# Patient Record
Sex: MALE | Race: OTHER
[De-identification: names, ages, dates, MRNs, and addresses within clinical notes are randomized per-mention and may not be internally consistent; named-entity substitution may affect disease eponyms.]

---

## 2019-02-04 ENCOUNTER — HOSPITAL ENCOUNTER (EMERGENCY)
Dept: HOSPITAL 54 - ER | Age: 29
Discharge: HOME | End: 2019-02-04
Payer: COMMERCIAL

## 2019-02-04 VITALS — WEIGHT: 180 LBS | HEIGHT: 77 IN | BODY MASS INDEX: 21.25 KG/M2

## 2019-02-04 VITALS — SYSTOLIC BLOOD PRESSURE: 154 MMHG | DIASTOLIC BLOOD PRESSURE: 82 MMHG

## 2019-02-04 DIAGNOSIS — Z86.19: ICD-10-CM

## 2019-02-04 DIAGNOSIS — F17.200: ICD-10-CM

## 2019-02-04 DIAGNOSIS — Z60.2: ICD-10-CM
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
## 2019-02-04 DIAGNOSIS — T40.601A: Primary | ICD-10-CM

## 2019-02-04 PROCEDURE — 96374 THER/PROPH/DIAG INJ IV PUSH: CPT

## 2019-02-04 PROCEDURE — 99283 EMERGENCY DEPT VISIT LOW MDM: CPT

## 2019-02-04 SDOH — SOCIAL STABILITY - SOCIAL INSECURITY: PROBLEMS RELATED TO LIVING ALONE: Z60.2

## 2019-02-04 NOTE — NUR
PT BIB RA. COMP OF "WAS GIVEN ALOT OF FENTANYL AND PASSED OUT". 911 WAS CALLED. 
PT AOX4. NARCAN GIVEN ON ROUTE. NO SOB NOTED. -N/V. -DIZZINESS. AWAITING MD ALMENDAREZ.

## 2021-01-12 ENCOUNTER — HOSPITAL ENCOUNTER (EMERGENCY)
Age: 31
Discharge: LEFT BEFORE DISCHARGE | End: 2021-01-12
Attending: EMERGENCY MEDICINE
Payer: COMMERCIAL

## 2021-01-12 VITALS
BODY MASS INDEX: 22.43 KG/M2 | HEIGHT: 77 IN | OXYGEN SATURATION: 100 % | RESPIRATION RATE: 16 BRPM | WEIGHT: 190 LBS | SYSTOLIC BLOOD PRESSURE: 154 MMHG | TEMPERATURE: 98 F | DIASTOLIC BLOOD PRESSURE: 95 MMHG | HEART RATE: 80 BPM

## 2021-01-12 DIAGNOSIS — R55 SYNCOPE AND COLLAPSE: Primary | ICD-10-CM

## 2021-01-12 LAB
EKG ATRIAL RATE: 87 BPM
EKG DIAGNOSIS: NORMAL
EKG P AXIS: 50 DEGREES
EKG P-R INTERVAL: 148 MS
EKG Q-T INTERVAL: 386 MS
EKG QRS DURATION: 100 MS
EKG QTC CALCULATION (BAZETT): 464 MS
EKG R AXIS: 35 DEGREES
EKG T AXIS: 51 DEGREES
EKG VENTRICULAR RATE: 87 BPM
GLUCOSE BLD-MCNC: 157 MG/DL (ref 70–99)
PERFORMED ON: ABNORMAL

## 2021-01-12 PROCEDURE — 93010 ELECTROCARDIOGRAM REPORT: CPT | Performed by: INTERNAL MEDICINE

## 2021-01-12 PROCEDURE — 99284 EMERGENCY DEPT VISIT MOD MDM: CPT

## 2021-01-12 PROCEDURE — 93005 ELECTROCARDIOGRAM TRACING: CPT | Performed by: EMERGENCY MEDICINE

## 2021-01-12 RX ORDER — ACETAMINOPHEN 325 MG/1
650 TABLET ORAL EVERY 6 HOURS PRN
COMMUNITY
End: 2021-05-10

## 2021-01-12 RX ORDER — ONDANSETRON 4 MG/1
4 TABLET, ORALLY DISINTEGRATING ORAL EVERY 8 HOURS PRN
COMMUNITY
End: 2021-04-06

## 2021-01-12 RX ORDER — FERROUS SULFATE 325(65) MG
325 TABLET ORAL
COMMUNITY

## 2021-01-12 RX ORDER — PANTOPRAZOLE SODIUM 40 MG/1
40 TABLET, DELAYED RELEASE ORAL DAILY
COMMUNITY
End: 2021-04-21 | Stop reason: SDUPTHER

## 2021-01-12 RX ORDER — LISINOPRIL 5 MG/1
5 TABLET ORAL DAILY
Status: ON HOLD | COMMUNITY
End: 2021-01-19 | Stop reason: HOSPADM

## 2021-01-12 RX ORDER — ACETAMINOPHEN 160 MG
2000 TABLET,DISINTEGRATING ORAL DAILY
COMMUNITY
End: 2021-05-10

## 2021-01-12 RX ORDER — AMLODIPINE BESYLATE 5 MG/1
5 TABLET ORAL 2 TIMES DAILY
COMMUNITY

## 2021-01-12 RX ORDER — SERTRALINE HYDROCHLORIDE 100 MG/1
100 TABLET, FILM COATED ORAL DAILY
COMMUNITY
End: 2021-10-05

## 2021-01-12 ASSESSMENT — PAIN SCALES - GENERAL: PAINLEVEL_OUTOF10: 7

## 2021-01-12 ASSESSMENT — PAIN DESCRIPTION - PAIN TYPE: TYPE: ACUTE PAIN

## 2021-01-12 ASSESSMENT — PAIN DESCRIPTION - LOCATION: LOCATION: HEAD

## 2021-01-12 NOTE — ED NOTES
Patient standing in doorway asking to be discharged. Encouraged patient to wait for physician prior to leaving.      Caitie Chavez RN  01/12/21 1003

## 2021-01-12 NOTE — ED TRIAGE NOTES
Patient arrived via [facility] for syncopal episode. Was playing video games and passed out. Alert and oriented upon arrival of ems to scene. Pt states he has headache that started after passing out. Recently diagnosed with covid on 12/27.
left toe injury

## 2021-01-12 NOTE — ED NOTES
Patient leaving AMA after speaking with Dr. Heather Monroy. Given AVS and discussed s/s to return to ED for. Alert and oriented at discharge with steady gait.      Neville Flanagan RN  01/12/21 7875

## 2021-01-12 NOTE — ED PROVIDER NOTES
[facility]  EMERGENCY DEPT VISIT      Patient Identification  Demian Coates is a 27 y.o. male. Chief Complaint   Loss of Consciousness and Headache      History of Present Illness: This is a  27 y.o. male who presents via ambulance to the ED with complaints of passing out at home. Patient was recently diagnosed with covid in late December. He just retested negaitive and had been feeling better. He was sitting and playing video games when he became lightehaded and then passed out. No injury or fall, he was on the couch. Denies preceding diaphoresis, chest pain, palpitations, shortness of breath, vomiting. Woke up with girlfriend there who called 911. He now complains only of mild headache. He states his appetite has been decreased during his covid infection and had not been eating well. No vomiting or diarrhea however. Additionally he has note been taking his BP nmeds regularly. He has h/o CKD from polycystic kidney disease. History reviewed. No pertinent past medical history. History reviewed. No pertinent surgical history. No current facility-administered medications for this encounter.      Current Outpatient Medications:     ferrous sulfate (IRON 325) 325 (65 Fe) MG tablet, Take 325 mg by mouth daily (with breakfast), Disp: , Rfl:     pantoprazole (PROTONIX) 40 MG tablet, Take 40 mg by mouth daily, Disp: , Rfl:     Cholecalciferol (VITAMIN D3) 50 MCG (2000 UT) CAPS, Take by mouth, Disp: , Rfl:     sertraline (ZOLOFT) 100 MG tablet, Take 100 mg by mouth daily, Disp: , Rfl:     lisinopril (PRINIVIL;ZESTRIL) 5 MG tablet, Take 5 mg by mouth daily, Disp: , Rfl:     ondansetron (ZOFRAN-ODT) 4 MG disintegrating tablet, Take 4 mg by mouth every 8 hours as needed for Nausea or Vomiting, Disp: , Rfl:     amLODIPine (NORVASC) 5 MG tablet, Take 5 mg by mouth daily, Disp: , Rfl:     acetaminophen (TYLENOL) 325 MG tablet, Take 650 mg by mouth every 6 hours as needed for Pain, Disp: , Rfl:     No Known Allergies    Social History     Socioeconomic History    Marital status: Single     Spouse name: Not on file    Number of children: Not on file    Years of education: Not on file    Highest education level: Not on file   Occupational History    Not on file   Social Needs    Financial resource strain: Not on file    Food insecurity     Worry: Not on file     Inability: Not on file    Transportation needs     Medical: Not on file     Non-medical: Not on file   Tobacco Use    Smoking status: Current Every Day Smoker     Packs/day: 1.00     Types: Cigarettes    Smokeless tobacco: Never Used   Substance and Sexual Activity    Alcohol use: Not Currently    Drug use: Never    Sexual activity: Not on file   Lifestyle    Physical activity     Days per week: Not on file     Minutes per session: Not on file    Stress: Not on file   Relationships    Social connections     Talks on phone: Not on file     Gets together: Not on file     Attends Confucianist service: Not on file     Active member of club or organization: Not on file     Attends meetings of clubs or organizations: Not on file     Relationship status: Not on file    Intimate partner violence     Fear of current or ex partner: Not on file     Emotionally abused: Not on file     Physically abused: Not on file     Forced sexual activity: Not on file   Other Topics Concern    Not on file   Social History Narrative    Not on file       Nursing Notes Reviewed      ROS:  GENERAL:  No fever, no chills, no diaphoresis, + appetite changes  EYES: no eye discharge, no eye redness, no visual changes  ENT: no nasal congestion, no sore throat  CARDIAC: no chest pain, no palpitations, no leg swelling  PULM: no cough, no shortness of breath  ABD: no abdominal pain, no nausea, no vomiting, no diarrhea, no melena or hematochezia  : no dysuria, no hematuria, no urgency, no frequency.  No flank pain  MUSCULOSKELETAL: no back pain, no arthralgias, no myalgias  NEURO: + headache, + lightheadedness, no dizziness, no numbness, no weakness, + syncope, no confusion, no speech difficulty  SKIN: no rashes, no erythema, no wounds, no ecchymosis      PHYSICAL EXAM:  GENERAL APPEARANCE: Mckenna Valencia is in no acute respiratory distress. Awake and alert. VITAL SIGNS:   ED Triage Vitals [01/12/21 1243]   Enc Vitals Group      BP (!) 154/95      Pulse 80      Resp 16      Temp 98 °F (36.7 °C)      Temp Source Oral      SpO2 100 %      Weight 190 lb (86.2 kg)      Height 6' 5\" (1.956 m)      Head Circumference       Peak Flow       Pain Score       Pain Loc       Pain Edu? Excl. in [address]? HEAD: Normocephalic, atraumatic. EYES:  Extraocular muscles are intact. Pupils equal round and reactive to light. Conjunctivas are pink. Negative scleral icterus. ENT:  Mucous membranes are moist.  Pharynx without erythema or exudates. NECK: Nontender and supple. No cervical adenopathy. CHEST:  Clear to auscultation bilaterally. No rales, rhonchi, or wheezing. HEART:  Regular rate and regular rhythm. No murmurs. Strong and equal pulses in the upper and lower extremities. ABDOMEN: Soft,  nondistended, positive bowel sounds. abdomen is nontender. No rebound. no guarding. MUSCULOSKELETAL: The calves are nontender to palpation. Active range of motion of the upper and lower extremities. No edema. NEUROLOGICAL: Awake, alert and oriented x 3. Power intact in the upper and lower extremities. Sensation is intact to light touch in the upper and lower extremities. Cranial Nerves 2-12 are intact. No truncal ataxia. No dysarthria or aphasia. DERMATOLOGIC: No petechiae, rashes, or ecchymoses. No erythema. PSYCH: normal mood and affect. Normal thought content. ED COURSE AND MEDICAL DECISION MAKING:    EKG as interpreted by myself:  normal sinus rhythm with a rate of 87  Axis is   Normal  QTc is  normal  Intervals and Durations are unremarkable. No specific ST-T wave changes appreciated.   No evidence of acute ischemia. Radiology:  Films have been read by radiologist as noted in chart unless otherwise stated. Other radiologic studies (i.e. CT, MRI, ultrasounds, etc ) have been interpreted by radiologist.     No orders to display       Labs:  Results for orders placed or performed during the hospital encounter of 01/12/21   POCT Glucose   Result Value Ref Range    POC Glucose 157 (H) 70 - 99 mg/dl    Performed on ACCU-CHEK    EKG 12 Lead   Result Value Ref Range    Ventricular Rate 87 BPM    Atrial Rate 87 BPM    P-R Interval 148 ms    QRS Duration 100 ms    Q-T Interval 386 ms    QTc Calculation (Bazett) 464 ms    P Axis 50 degrees    R Axis 35 degrees    T Axis 51 degrees    Diagnosis       Normal sinus rhythmNormal ECGConfirmed by CANDICE JESSICA, Jefry Ro ([id number]) on 1/12/2021 1:59:56 PM       Treatment in the department:  Patient received the following while in the ED. No meds      Medical decision making:  Patient presents after syncopal episode without much prodrome and not while standing. Has h/o CKD with last creat over 4 and has recently been sick with covid. I recommended getting labwork and monitoring. At risk for electrolyte disturbance, worsening kidney failure, arrhythmia. Patient declined any further workup or monitoring and wants to leave AMA. Understands risk of recurrent syncope, heart attack, arrhythmia, death, stroke. Clinical Impression:  1. Syncope and collapse        Dispo:  Patient will be leaving AMA at this time    I discussed the nature and purpose, risks and benefits, as well as, the alternatives of staying for labwork and monitoring vs discharge without workup with Deejay Denisetania. Deejay Elio was given the time and opportunity to ask questions and consider their options, and after the discussion, Deejay Howietania decided to refuse. I informed Deejay Ballard that refusal could lead to, but was not limited to, death, permanent disability, or severe pain.  If present, I asked the relatives or significant others of Lara Barry to dissuade them without success. Prior to refusing, their nurse and I determined and agreed that Lara Barry had the capacity to make this decision and understood the consequences of that decision. Lara Barry signed the refusal of treatment form and their nurse signed the form agreeing that the patient/guardian had received informed consent. After refusal, I made every reasonable opportunity to treat Lara Barry to the best of my ability. Discharge vitals:  Blood pressure (!) 154/95, pulse 80, temperature 98 °F (36.7 °C), temperature source Oral, resp. rate 16, height 6' 5\" (1.956 m), weight 190 lb (86.2 kg), SpO2 100 %. Prescriptions given:   Discharge Medication List as of 1/12/2021  2:19 PM          This chart was created using Dragon voice recognition software.         Emerita Helm MD  01/12/21 9549

## 2021-01-18 ENCOUNTER — APPOINTMENT (OUTPATIENT)
Dept: CT IMAGING | Age: 31
DRG: 312 | End: 2021-01-18
Payer: COMMERCIAL

## 2021-01-18 ENCOUNTER — HOSPITAL ENCOUNTER (INPATIENT)
Age: 31
LOS: 1 days | Discharge: HOME | DRG: 312 | End: 2021-01-19
Attending: EMERGENCY MEDICINE | Admitting: INTERNAL MEDICINE
Payer: COMMERCIAL

## 2021-01-18 ENCOUNTER — APPOINTMENT (OUTPATIENT)
Dept: GENERAL RADIOLOGY | Age: 31
DRG: 312 | End: 2021-01-18
Payer: COMMERCIAL

## 2021-01-18 DIAGNOSIS — R31.9 URINARY TRACT INFECTION WITH HEMATURIA, SITE UNSPECIFIED: ICD-10-CM

## 2021-01-18 DIAGNOSIS — D63.8 ANEMIA OF CHRONIC DISEASE: ICD-10-CM

## 2021-01-18 DIAGNOSIS — R55 SYNCOPE AND COLLAPSE: Primary | ICD-10-CM

## 2021-01-18 DIAGNOSIS — N17.9 ACUTE KIDNEY INJURY SUPERIMPOSED ON CHRONIC KIDNEY DISEASE (HCC): ICD-10-CM

## 2021-01-18 DIAGNOSIS — N18.9 ACUTE KIDNEY INJURY SUPERIMPOSED ON CHRONIC KIDNEY DISEASE (HCC): ICD-10-CM

## 2021-01-18 DIAGNOSIS — N39.0 URINARY TRACT INFECTION WITH HEMATURIA, SITE UNSPECIFIED: ICD-10-CM

## 2021-01-18 PROBLEM — Q61.3 POLYCYSTIC KIDNEY: Status: ACTIVE | Noted: 2021-01-18

## 2021-01-18 LAB
A/G RATIO: 1.4 (ref 1.1–2.2)
ALBUMIN SERPL-MCNC: 4.2 G/DL (ref 3.4–5)
ALP BLD-CCNC: 58 U/L (ref 40–129)
ALT SERPL-CCNC: 19 U/L (ref 10–40)
AMPHETAMINE SCREEN, URINE: NORMAL
ANION GAP SERPL CALCULATED.3IONS-SCNC: 12 MMOL/L (ref 3–16)
AST SERPL-CCNC: 15 U/L (ref 15–37)
BACTERIA: ABNORMAL /HPF
BARBITURATE SCREEN URINE: NORMAL
BASOPHILS ABSOLUTE: 0.4 K/UL (ref 0–0.2)
BASOPHILS RELATIVE PERCENT: 5.1 %
BENZODIAZEPINE SCREEN, URINE: NORMAL
BILIRUB SERPL-MCNC: <0.2 MG/DL (ref 0–1)
BILIRUBIN URINE: NEGATIVE
BLOOD, URINE: ABNORMAL
BUN BLDV-MCNC: 51 MG/DL (ref 7–20)
C. TRACHOMATIS DNA ,URINE: NEGATIVE
CALCIUM SERPL-MCNC: 8.6 MG/DL (ref 8.3–10.6)
CANNABINOID SCREEN URINE: NORMAL
CHLORIDE BLD-SCNC: 106 MMOL/L (ref 99–110)
CLARITY: ABNORMAL
CO2: 21 MMOL/L (ref 21–32)
COCAINE METABOLITE SCREEN URINE: NORMAL
COLOR: YELLOW
CREAT SERPL-MCNC: 5 MG/DL (ref 0.9–1.3)
EKG ATRIAL RATE: 86 BPM
EKG DIAGNOSIS: NORMAL
EKG P AXIS: 59 DEGREES
EKG P-R INTERVAL: 152 MS
EKG Q-T INTERVAL: 398 MS
EKG QRS DURATION: 100 MS
EKG QTC CALCULATION (BAZETT): 476 MS
EKG R AXIS: 48 DEGREES
EKG T AXIS: 54 DEGREES
EKG VENTRICULAR RATE: 86 BPM
EOSINOPHILS ABSOLUTE: 0.1 K/UL (ref 0–0.6)
EOSINOPHILS RELATIVE PERCENT: 1.1 %
EPITHELIAL CELLS, UA: ABNORMAL /HPF (ref 0–5)
GFR AFRICAN AMERICAN: 17
GFR NON-AFRICAN AMERICAN: 14
GLOBULIN: 3 G/DL
GLUCOSE BLD-MCNC: 195 MG/DL (ref 70–99)
GLUCOSE URINE: NEGATIVE MG/DL
HCT VFR BLD CALC: 29.7 % (ref 40.5–52.5)
HCT VFR BLD CALC: 30 % (ref 40.5–52.5)
HEMOGLOBIN: 9.9 G/DL (ref 13.5–17.5)
IMMATURE RETIC FRACT: 0.31 (ref 0.21–0.37)
KETONES, URINE: NEGATIVE MG/DL
LEUKOCYTE ESTERASE, URINE: ABNORMAL
LV EF: 58 %
LVEF MODALITY: NORMAL
LYMPHOCYTES ABSOLUTE: 0.8 K/UL (ref 1–5.1)
LYMPHOCYTES RELATIVE PERCENT: 9.5 %
Lab: NORMAL
MAGNESIUM: 2.2 MG/DL (ref 1.8–2.4)
MCH RBC QN AUTO: 30.7 PG (ref 26–34)
MCHC RBC AUTO-ENTMCNC: 33.2 G/DL (ref 31–36)
MCV RBC AUTO: 92.4 FL (ref 80–100)
METHADONE SCREEN, URINE: NORMAL
MICROSCOPIC EXAMINATION: YES
MONOCYTES ABSOLUTE: 0.5 K/UL (ref 0–1.3)
MONOCYTES RELATIVE PERCENT: 5.6 %
N. GONORRHOEAE DNA, URINE: NEGATIVE
NEUTROPHILS ABSOLUTE: 6.6 K/UL (ref 1.7–7.7)
NEUTROPHILS RELATIVE PERCENT: 78.7 %
NITRITE, URINE: NEGATIVE
OPIATE SCREEN URINE: NORMAL
OXYCODONE URINE: NORMAL
PDW BLD-RTO: 13.4 % (ref 12.4–15.4)
PH UA: 6
PH UA: 6 (ref 5–8)
PHENCYCLIDINE SCREEN URINE: NORMAL
PLATELET # BLD: 132 K/UL (ref 135–450)
PMV BLD AUTO: 10.2 FL (ref 5–10.5)
POTASSIUM SERPL-SCNC: 4.4 MMOL/L (ref 3.5–5.1)
PROPOXYPHENE SCREEN: NORMAL
PROTEIN UA: 100 MG/DL
RBC # BLD: 3.22 M/UL (ref 4.2–5.9)
RBC UA: ABNORMAL /HPF (ref 0–4)
RETICULOCYTE ABSOLUTE COUNT: 0.02 M/UL
RETICULOCYTE COUNT PCT: 0.49 % (ref 0.5–2.18)
SODIUM BLD-SCNC: 139 MMOL/L (ref 136–145)
SPECIFIC GRAVITY UA: 1.02 (ref 1–1.03)
TOTAL PROTEIN: 7.2 G/DL (ref 6.4–8.2)
URINE TYPE: ABNORMAL
UROBILINOGEN, URINE: 0.2 E.U./DL
WBC # BLD: 8.4 K/UL (ref 4–11)
WBC UA: ABNORMAL /HPF (ref 0–5)

## 2021-01-18 PROCEDURE — 1200000000 HC SEMI PRIVATE

## 2021-01-18 PROCEDURE — 87086 URINE CULTURE/COLONY COUNT: CPT

## 2021-01-18 PROCEDURE — 6360000002 HC RX W HCPCS: Performed by: EMERGENCY MEDICINE

## 2021-01-18 PROCEDURE — 85045 AUTOMATED RETICULOCYTE COUNT: CPT

## 2021-01-18 PROCEDURE — 2580000003 HC RX 258: Performed by: INTERNAL MEDICINE

## 2021-01-18 PROCEDURE — 80053 COMPREHEN METABOLIC PANEL: CPT

## 2021-01-18 PROCEDURE — 81001 URINALYSIS AUTO W/SCOPE: CPT

## 2021-01-18 PROCEDURE — 87591 N.GONORRHOEAE DNA AMP PROB: CPT

## 2021-01-18 PROCEDURE — 71046 X-RAY EXAM CHEST 2 VIEWS: CPT

## 2021-01-18 PROCEDURE — G0378 HOSPITAL OBSERVATION PER HR: HCPCS

## 2021-01-18 PROCEDURE — 36415 COLL VENOUS BLD VENIPUNCTURE: CPT

## 2021-01-18 PROCEDURE — 2580000003 HC RX 258: Performed by: EMERGENCY MEDICINE

## 2021-01-18 PROCEDURE — 96361 HYDRATE IV INFUSION ADD-ON: CPT

## 2021-01-18 PROCEDURE — 85025 COMPLETE CBC W/AUTO DIFF WBC: CPT

## 2021-01-18 PROCEDURE — 99285 EMERGENCY DEPT VISIT HI MDM: CPT

## 2021-01-18 PROCEDURE — 87491 CHLMYD TRACH DNA AMP PROBE: CPT

## 2021-01-18 PROCEDURE — 6370000000 HC RX 637 (ALT 250 FOR IP): Performed by: INTERNAL MEDICINE

## 2021-01-18 PROCEDURE — 87390 HIV-1 AG IA: CPT

## 2021-01-18 PROCEDURE — 96365 THER/PROPH/DIAG IV INF INIT: CPT

## 2021-01-18 PROCEDURE — 93010 ELECTROCARDIOGRAM REPORT: CPT | Performed by: INTERNAL MEDICINE

## 2021-01-18 PROCEDURE — 80307 DRUG TEST PRSMV CHEM ANLYZR: CPT

## 2021-01-18 PROCEDURE — 93306 TTE W/DOPPLER COMPLETE: CPT

## 2021-01-18 PROCEDURE — 93005 ELECTROCARDIOGRAM TRACING: CPT | Performed by: EMERGENCY MEDICINE

## 2021-01-18 PROCEDURE — 83735 ASSAY OF MAGNESIUM: CPT

## 2021-01-18 PROCEDURE — 86701 HIV-1ANTIBODY: CPT

## 2021-01-18 PROCEDURE — 86702 HIV-2 ANTIBODY: CPT

## 2021-01-18 PROCEDURE — 6360000002 HC RX W HCPCS: Performed by: INTERNAL MEDICINE

## 2021-01-18 PROCEDURE — 96372 THER/PROPH/DIAG INJ SC/IM: CPT

## 2021-01-18 PROCEDURE — 74176 CT ABD & PELVIS W/O CONTRAST: CPT

## 2021-01-18 RX ORDER — SODIUM CHLORIDE 0.9 % (FLUSH) 0.9 %
10 SYRINGE (ML) INJECTION EVERY 12 HOURS SCHEDULED
Status: DISCONTINUED | OUTPATIENT
Start: 2021-01-18 | End: 2021-01-19 | Stop reason: HOSPADM

## 2021-01-18 RX ORDER — PANTOPRAZOLE SODIUM 40 MG/1
40 TABLET, DELAYED RELEASE ORAL DAILY
Status: DISCONTINUED | OUTPATIENT
Start: 2021-01-18 | End: 2021-01-19 | Stop reason: HOSPADM

## 2021-01-18 RX ORDER — ONDANSETRON 4 MG/1
4 TABLET, ORALLY DISINTEGRATING ORAL EVERY 8 HOURS PRN
Status: DISCONTINUED | OUTPATIENT
Start: 2021-01-18 | End: 2021-01-19 | Stop reason: HOSPADM

## 2021-01-18 RX ORDER — AMLODIPINE BESYLATE 5 MG/1
5 TABLET ORAL 2 TIMES DAILY
Status: DISCONTINUED | OUTPATIENT
Start: 2021-01-18 | End: 2021-01-19 | Stop reason: HOSPADM

## 2021-01-18 RX ORDER — AZITHROMYCIN 250 MG/1
1000 TABLET, FILM COATED ORAL ONCE
Status: COMPLETED | OUTPATIENT
Start: 2021-01-18 | End: 2021-01-18

## 2021-01-18 RX ORDER — ACETAMINOPHEN 325 MG/1
650 TABLET ORAL EVERY 6 HOURS PRN
Status: DISCONTINUED | OUTPATIENT
Start: 2021-01-18 | End: 2021-01-19 | Stop reason: HOSPADM

## 2021-01-18 RX ORDER — 0.9 % SODIUM CHLORIDE 0.9 %
500 INTRAVENOUS SOLUTION INTRAVENOUS ONCE
Status: COMPLETED | OUTPATIENT
Start: 2021-01-18 | End: 2021-01-18

## 2021-01-18 RX ORDER — SODIUM CHLORIDE 0.9 % (FLUSH) 0.9 %
10 SYRINGE (ML) INJECTION PRN
Status: DISCONTINUED | OUTPATIENT
Start: 2021-01-18 | End: 2021-01-19 | Stop reason: HOSPADM

## 2021-01-18 RX ORDER — ONDANSETRON 2 MG/ML
4 INJECTION INTRAMUSCULAR; INTRAVENOUS EVERY 6 HOURS PRN
Status: DISCONTINUED | OUTPATIENT
Start: 2021-01-18 | End: 2021-01-19 | Stop reason: HOSPADM

## 2021-01-18 RX ORDER — POLYETHYLENE GLYCOL 3350 17 G/17G
17 POWDER, FOR SOLUTION ORAL DAILY PRN
Status: DISCONTINUED | OUTPATIENT
Start: 2021-01-18 | End: 2021-01-19 | Stop reason: HOSPADM

## 2021-01-18 RX ORDER — FERROUS SULFATE 325(65) MG
325 TABLET ORAL
Status: DISCONTINUED | OUTPATIENT
Start: 2021-01-19 | End: 2021-01-19 | Stop reason: HOSPADM

## 2021-01-18 RX ORDER — ACETAMINOPHEN 325 MG/1
650 TABLET ORAL EVERY 6 HOURS PRN
Status: DISCONTINUED | OUTPATIENT
Start: 2021-01-18 | End: 2021-01-18 | Stop reason: SDUPTHER

## 2021-01-18 RX ORDER — SODIUM CHLORIDE 9 MG/ML
INJECTION, SOLUTION INTRAVENOUS CONTINUOUS
Status: DISCONTINUED | OUTPATIENT
Start: 2021-01-18 | End: 2021-01-19 | Stop reason: HOSPADM

## 2021-01-18 RX ORDER — ACETAMINOPHEN 650 MG/1
650 SUPPOSITORY RECTAL EVERY 6 HOURS PRN
Status: DISCONTINUED | OUTPATIENT
Start: 2021-01-18 | End: 2021-01-19 | Stop reason: HOSPADM

## 2021-01-18 RX ORDER — PROMETHAZINE HYDROCHLORIDE 25 MG/1
12.5 TABLET ORAL EVERY 6 HOURS PRN
Status: DISCONTINUED | OUTPATIENT
Start: 2021-01-18 | End: 2021-01-19 | Stop reason: HOSPADM

## 2021-01-18 RX ORDER — SERTRALINE HYDROCHLORIDE 100 MG/1
100 TABLET, FILM COATED ORAL DAILY
Status: DISCONTINUED | OUTPATIENT
Start: 2021-01-18 | End: 2021-01-19 | Stop reason: HOSPADM

## 2021-01-18 RX ORDER — HEPARIN SODIUM 5000 [USP'U]/ML
5000 INJECTION, SOLUTION INTRAVENOUS; SUBCUTANEOUS EVERY 8 HOURS SCHEDULED
Status: DISCONTINUED | OUTPATIENT
Start: 2021-01-18 | End: 2021-01-19 | Stop reason: HOSPADM

## 2021-01-18 RX ADMIN — HEPARIN SODIUM 5000 UNITS: 5000 INJECTION INTRAVENOUS; SUBCUTANEOUS at 13:45

## 2021-01-18 RX ADMIN — Medication 10 ML: at 11:24

## 2021-01-18 RX ADMIN — PANTOPRAZOLE SODIUM 40 MG: 40 TABLET, DELAYED RELEASE ORAL at 11:24

## 2021-01-18 RX ADMIN — HEPARIN SODIUM 5000 UNITS: 5000 INJECTION INTRAVENOUS; SUBCUTANEOUS at 20:50

## 2021-01-18 RX ADMIN — SODIUM CHLORIDE: 9 INJECTION, SOLUTION INTRAVENOUS at 10:48

## 2021-01-18 RX ADMIN — AMLODIPINE BESYLATE 5 MG: 5 TABLET ORAL at 20:50

## 2021-01-18 RX ADMIN — DEXTROSE MONOHYDRATE 1 G: 5 INJECTION INTRAVENOUS at 05:10

## 2021-01-18 RX ADMIN — AMLODIPINE BESYLATE 5 MG: 5 TABLET ORAL at 13:44

## 2021-01-18 RX ADMIN — SODIUM CHLORIDE 500 ML: 900 INJECTION, SOLUTION INTRAVENOUS at 04:10

## 2021-01-18 RX ADMIN — AZITHROMYCIN MONOHYDRATE 1000 MG: 250 TABLET ORAL at 11:24

## 2021-01-18 RX ADMIN — SERTRALINE HYDROCHLORIDE 100 MG: 100 TABLET ORAL at 11:24

## 2021-01-18 ASSESSMENT — ENCOUNTER SYMPTOMS
ANAL BLEEDING: 1
CHOKING: 0
EYE ITCHING: 0
NAUSEA: 0
STRIDOR: 0
PHOTOPHOBIA: 0
DIARRHEA: 0
SINUS PRESSURE: 0
VOMITING: 0
EYE DISCHARGE: 0
SHORTNESS OF BREATH: 1
EYE PAIN: 0
RHINORRHEA: 0
APNEA: 0
ABDOMINAL PAIN: 0
ABDOMINAL DISTENTION: 0
CHEST TIGHTNESS: 0
BLOOD IN STOOL: 1
SORE THROAT: 0
COUGH: 1
TROUBLE SWALLOWING: 0
CONSTIPATION: 1

## 2021-01-18 ASSESSMENT — PAIN SCALES - GENERAL
PAINLEVEL_OUTOF10: 0
PAINLEVEL_OUTOF10: 0

## 2021-01-18 NOTE — PLAN OF CARE
Problem: Falls - Risk of:  Goal: Will remain free from falls  Description: Will remain free from falls  Outcome: Met This Shift  Note: Bed in low position, wheels locked, call light in reach. Bed alarm on. Reviewed safety, patient agreeable to call prior to ambulation. Safety maintained. Problem: Fluid Volume:  Goal: Will show no signs or symptoms of fluid imbalance  Description: Will show no signs or symptoms of fluid imbalance  Outcome: Ongoing  Note: Patient vitals stable. Urinary output > 30 ml/hr. NS infusing at 100 ml/hr RAC, maintained. Tolerating renal diet. Will monitor. Problem: Nutritional:  Goal: Maintenance of adequate nutrition will be supported  Description: Maintenance of adequate nutrition will be supported  Outcome: Ongoing  Note: Patient eating  % of meals. Denies nausea. Will monitor. Problem: Urinary Elimination:  Goal: Ability to achieve and maintain adequate urine output will be supported  Description: Ability to achieve and maintain adequate urine output will be supported  Outcome: Met This Shift  Note: Patient with 700 ml of urine out this shift. Clear yellow. Will monitor.

## 2021-01-18 NOTE — PROGRESS NOTES
Patient alert and oriented. Vitals stable, denies pain or nausea. Assessment wnl. Patient denies dizziness. IV RAC, started NS infusion at 100 ml/hr. Oral antibiotic given. Patient left floor for echo, returned. NSR on Tele-monitor. Tolerating renal diet. Reviewed safety and plan of care. Bed alarm on, urinal for elimination, call light in reach. Will monitor.

## 2021-01-18 NOTE — LETTER
[facility] [address] [facility]  [address] [place] [id number]  Phone: [phone]             January 19, 2021    Patient: Syeda Moser   YOB: 1990   Date of Visit: 1/18/2021       To Whom It May Concern:    Syeda Moser was seen and treated in our facility  beginning 1/18/2021 until  1/19/2021. He may return to work on 1/20/2021.       Sincerely,       Russ Ramirez MD         Signature:__________________________________

## 2021-01-18 NOTE — ED NOTES
Pt informed that eta for transport was around 8am and pt states that he had a panic attack about 20 minutes prior to the syncopal episode and thinks he forgot to tell the MD that information upon his arrival to ER. Pt urinated about 400 ml in urinal and when asked about dialysis pt states that he is not on it yet but it has been discussed and his kidney doctor is through [facility]. Pt states that he is still having issues with his breathing since having covid back in December.      Hamilton Cuevas RN  01/18/21 8113

## 2021-01-18 NOTE — ED PROVIDER NOTES
[facility]  EMERGENCY DEPT VISIT      Patient Identification  Jamie Velarde is a 27 y.o. male. Chief Complaint   Loss of Consciousness (States he was watching a movie and when he got up he had LOC for a couple of minutes per Squad. Denies pain. Squad also states patient has been recently told he is in kidney failure. Squad states patient was Alert and Oriented when they got there. )      History of Present Illness: This is a  27 y.o. male who presents via ambulance to the ED with complaints of a syncopal episode. Patient seen in ED for syncope 1/12/21 but left AMA prior to having any labwork completed. Today he was watching a movie, stood up and passed out. No injury from fall. Reports feeling lightheaded. No headache. No chest pain. No palpitations. No shortness of breath. Recent COVID late December. Still has slight cough. No fever. No vomiting or diarrhea. No abdominal pain. Has h/o PCKD and CRF. Has not been taking his meds regularly. Past Medical History:   Diagnosis Date    Chronic kidney disease     Hematuria     Hepatitis C     Hypertension     Kidney failure     Overdose     Polycystic kidney        History reviewed. No pertinent surgical history. No current facility-administered medications for this encounter.      Current Outpatient Medications:     ferrous sulfate (IRON 325) 325 (65 Fe) MG tablet, Take 325 mg by mouth daily (with breakfast), Disp: , Rfl:     pantoprazole (PROTONIX) 40 MG tablet, Take 40 mg by mouth daily, Disp: , Rfl:     Cholecalciferol (VITAMIN D3) 50 MCG (2000 UT) CAPS, Take by mouth, Disp: , Rfl:     sertraline (ZOLOFT) 100 MG tablet, Take 100 mg by mouth daily, Disp: , Rfl:     lisinopril (PRINIVIL;ZESTRIL) 5 MG tablet, Take 5 mg by mouth daily, Disp: , Rfl:     ondansetron (ZOFRAN-ODT) 4 MG disintegrating tablet, Take 4 mg by mouth every 8 hours as needed for Nausea or Vomiting, Disp: , Rfl: PULM: + cough, no shortness of breath  ABD: no abdominal pain, no nausea, no vomiting, no diarrhea, no melena or hematochezia  : no dysuria, no hematuria, no urgency, no frequency. No flank pain  MUSCULOSKELETAL: no back pain, no arthralgias, no myalgias  NEURO: no headache, + lightheadedness, no dizziness, no numbness, no weakness, + syncope, no confusion, no speech difficulty  SKIN: no rashes, no erythema, no wounds, no ecchymosis      PHYSICAL EXAM:  GENERAL APPEARANCE: Wyatt Leos is in no acute respiratory distress. Awake and alert. VITAL SIGNS:   ED Triage Vitals [01/18/21 0354]   Enc Vitals Group      BP (!) 174/88      Pulse 88      Resp 18      Temp 98 °F (36.7 °C)      Temp Source Oral      SpO2 100 %      Weight 190 lb (86.2 kg)      Height       Head Circumference       Peak Flow       Pain Score       Pain Loc       Pain Edu? Excl. in [address]? HEAD: Normocephalic, atraumatic. EYES:  Extraocular muscles are intact. Pupils equal round and reactive to light. Conjunctivas are pink. Negative scleral icterus. ENT:  Mucous membranes are moist.  Pharynx without erythema or exudates. NECK: Nontender and supple. No cervical adenopathy. CHEST:  Clear to auscultation bilaterally. No rales, rhonchi, or wheezing. HEART:  Regular rate and regular rhythm. No murmurs. Strong and equal pulses in the upper and lower extremities. ABDOMEN: Soft,  nondistended, positive bowel sounds. abdomen is nontender. No rebound. no guarding. MUSCULOSKELETAL: The calves are nontender to palpation. Active range of motion of the upper and lower extremities. No edema. NEUROLOGICAL: Awake, alert and oriented x 3. Power intact in the upper and lower extremities. Sensation is intact to light touch in the upper and lower extremities. Cranial Nerves 2-12 are intact. No truncal ataxia. No dysarthria or aphasia. DERMATOLOGIC: No petechiae, rashes, or ecchymoses. No erythema. PSYCH: normal mood and affect. Normal thought content. ED COURSE AND MEDICAL DECISION MAKING:    EKG as interpreted by myself:  normal sinus rhythm with a rate of 86  Axis is   Normal  QTc is  476ms  Intervals and Durations are unremarkable. No specific ST-T wave changes appreciated. No evidence of acute ischemia. Compared to prior EKG dated 1/12/21, no significant change    Radiology:  Films have been read by radiologist as noted in chart unless otherwise stated. Other radiologic studies (i.e. CT, MRI, ultrasounds, etc ) have been interpreted by radiologist.     [place]   Final Result        Large bolus right apical pneumothorax and mild centrilobular/paraseptal    emphysema. No pneumothorax.   _______________________________________________       IMPRESSION:             1.  Consistent with adult polycystic kidney disease. Few Punctate    nonobstructive stones versus calcified cysts. Kidneys are significantly    enlarged. XR CHEST (2 VW)   Final Result        Large bullous emphysema in the right upper lobe measuring approximately    15 cm. Less likely pneumothorax. Recommend CT of the chest for better    characterization. Xr Chest (2 Vw)    Result Date: 1/18/2021  Patient: Yoselin Nguyen  Time Out: 05:23 Exam(s): FILM CXR 2 VIEWS  EXAM:   XR Chest, 2 Views  CLINICAL HISTORY:   cough, fainting. TECHNIQUE:   Frontal and lateral views of the chest.  COMPARISON:   No relevant prior studies available. FINDINGS:   Lungs:  No consolidation or mass. Large bullous air-filled structure in the right upper lobe. Pleural space:  No effusion. Heart:  No cardiomegaly. Bones/joints:  No acute findings. Electronically signed by Ministerio Nelson MD on 01-18-21 at 1790      Large bullous emphysema in the right upper lobe measuring approximately 15 cm. Less likely pneumothorax. Recommend CT of the chest for better characterization. Ct Chest Abdomen Pelvis Wo Contrast    Result Date: 1/18/2021 Patient: Bethany Vasquez  Time Out: 06:32 Exam(s): CT CHEST Without Contrast, CT ABDOMEN + PELVIS Without Contrast  EXAM:   CT Chest Without Intravenous Contrast  CLINICAL HISTORY:   large bulla vs pneumo, JALEN with PCKD, recurrent syncope. TECHNIQUE:   Axial computed tomography images of the chest without intravenous contrast.  CTDI is 9 mGy and DLP is 746 mGy-cm. All CT scans at this facility use dose modulation, iterative reconstruction, and/or weight based dosing when appropriate to reduce radiation dose to as low as reasonably achievable. COMPARISON:   Chest x-ray same date  FINDINGS:   Lungs:  No mass. No consolidation. Large bullous emphysema in the right upper lobe. Mild paraseptal and centrilobular emphysema. Pleural space:  No pneumothorax. No effusion. Heart:  Normal heart size. No pericardial effusion. Bones/joints:  No acute fracture. Soft tissues:  Unremarkable. Vasculature:  Unremarkable. No thoracic aortic aneurysm. Lymph nodes:  No enlarged lymph nodes. EXAM:   CT Abdomen and Pelvis Without Intravenous Contrast  CLINICAL HISTORY:   large bulla vs pneumo, JALEN with PCKD, recurrent syncope. TECHNIQUE:   Axial computed tomography images of the abdomen and pelvis without intravenous contrast.  CTDI is 9 mGy and DLP is 746 mGy-cm. All CT scans at this facility use dose modulation, iterative reconstruction, and/or weight based dosing when appropriate to reduce radiation dose to as low as reasonably achievable. COMPARISON:   No relevant prior studies available. FINDINGS:     ABDOMEN:   Liver:  Unremarkable. Gallbladder and bile ducts:  Unremarkable. Pancreas:  No ductal dilation. Spleen:  Unremarkable. Adrenals:  Unremarkable. Kidneys and ureters:  No obstructing stones. No hydronephrosis. Enlarged multicystic kidneys. Few Punctate nonobstructive stones versus calcified cysts. Stomach and bowel:  No bowel obstruction. No bowel wall thickening.    PELVIS:   Appendix: No evidence of appendicitis. Bladder:  No stones. Reproductive:  Unremarkable. ABDOMEN and PELVIS:   Intraperitoneal space:  Unremarkable. Bones/joints:  No acute fractures. Soft tissues:  Unremarkable. Vasculature:  No abdominal aortic aneurysm. Lymph nodes:  No enlarged lymph nodes. Electronically signed by Maru Fall MD on 01-18-21 at 9038      Large bolus right apical pneumothorax and mild centrilobular/paraseptal emphysema. No pneumothorax. _______________________________________________  IMPRESSION:       1. Consistent with adult polycystic kidney disease. Few Punctate nonobstructive stones versus calcified cysts. Kidneys are significantly enlarged.     Labs:  Results for orders placed or performed during the hospital encounter of 01/18/21   CBC Auto Differential   Result Value Ref Range    WBC 8.4 4.0 - 11.0 K/uL    RBC 3.22 (L) 4.20 - 5.90 M/uL    Hemoglobin 9.9 (L) 13.5 - 17.5 g/dL    Hematocrit 29.7 (L) 40.5 - 52.5 %    MCV 92.4 80.0 - 100.0 fL    MCH 30.7 26.0 - 34.0 pg    MCHC 33.2 31.0 - 36.0 g/dL    RDW 13.4 12.4 - 15.4 %    Platelets 281 (L) 719 - 450 K/uL    MPV 10.2 5.0 - 10.5 fL    Neutrophils % 78.7 %    Lymphocytes % 9.5 %    Monocytes % 5.6 %    Eosinophils % 1.1 %    Basophils % 5.1 %    Neutrophils Absolute 6.6 1.7 - 7.7 K/uL    Lymphocytes Absolute 0.8 (L) 1.0 - 5.1 K/uL    Monocytes Absolute 0.5 0.0 - 1.3 K/uL    Eosinophils Absolute 0.1 0.0 - 0.6 K/uL    Basophils Absolute 0.4 (H) 0.0 - 0.2 K/uL   Comprehensive Metabolic Panel   Result Value Ref Range    Sodium 139 136 - 145 mmol/L    Potassium 4.4 3.5 - 5.1 mmol/L    Chloride 106 99 - 110 mmol/L    CO2 21 21 - 32 mmol/L    Anion Gap 12 3 - 16    Glucose 195 (H) 70 - 99 mg/dL    BUN 51 (H) 7 - 20 mg/dL    CREATININE 5.0 (H) 0.9 - 1.3 mg/dL    GFR Non-African American 14 (A) >60    GFR  17 (A) >60    Calcium 8.6 8.3 - 10.6 mg/dL    Total Protein 7.2 6.4 - 8.2 g/dL    Alb 4.2 3.4 - 5.0 g/dL Albumin/Globulin Ratio 1.4 1.1 - 2.2    Total Bilirubin <0.2 0.0 - 1.0 mg/dL    Alkaline Phosphatase 58 40 - 129 U/L    ALT 19 10 - 40 U/L    AST 15 15 - 37 U/L    Globulin 3.0 g/dL   Magnesium   Result Value Ref Range    Magnesium 2.20 1.80 - 2.40 mg/dL   Urinalysis   Result Value Ref Range    Color, UA Yellow Straw/Yellow    Clarity, UA SL CLOUDY (A) Clear    Glucose, Ur Negative Negative mg/dL    Bilirubin Urine Negative Negative    Ketones, Urine Negative Negative mg/dL    Specific Gravity, UA 1.025 1.005 - 1.030    Blood, Urine LARGE (A) Negative    pH, UA 6.0 5.0 - 8.0    Protein,  (A) Negative mg/dL    Urobilinogen, Urine 0.2 <2.0 E.U./dL    Nitrite, Urine Negative Negative    Leukocyte Esterase, Urine TRACE (A) Negative    Microscopic Examination YES     Urine Type NotGiven    Urine Drug Screen   Result Value Ref Range    Amphetamine Screen, Urine Neg Negative <1000ng/mL    Barbiturate Screen, Ur Neg Negative <200 ng/mL    Benzodiazepine Screen, Urine Neg Negative <200 ng/mL    Cannabinoid Scrn, Ur Neg Negative <50 ng/mL    Cocaine Metabolite Screen, Urine Neg Negative <300 ng/mL    Opiate Scrn, Ur Neg Negative <300 ng/mL    PCP Screen, Urine Neg Negative <25 ng/mL    Methadone Screen, Urine Neg Negative <300 ng/mL    Propoxyphene Scrn, Ur Neg Negative <300 ng/mL    Oxycodone Urine Neg Negative <100 ng/ml    pH, UA 6.0     Drug Screen Comment: see below    Microscopic Urinalysis   Result Value Ref Range    WBC, UA 21-50 (A) 0 - 5 /HPF    RBC, UA  (A) 0 - 4 /HPF    Epithelial Cells, UA 0-1 0 - 5 /HPF    Bacteria, UA Rare (A) None Seen /HPF   EKG 12 Lead   Result Value Ref Range    Ventricular Rate 86 BPM    Atrial Rate 86 BPM    P-R Interval 152 ms    QRS Duration 100 ms    Q-T Interval 398 ms    QTc Calculation (Bazett) 476 ms    P Axis 59 degrees    R Axis 48 degrees    T Axis 54 degrees    Diagnosis Poor data quality, interpretation may be adversely affectedNormal sinus rhythmNormal ECG       Treatment in the department:  Patient received the following while in the ED. Medications   0.9 % sodium chloride bolus (0 mLs Intravenous Stopped 1/18/21 0511)   cefTRIAXone (ROCEPHIN) 1 g IVPB in 50 mL D5W minibag (0 g Intravenous Stopped 1/18/21 0538)     REVIEW OF PAST RECORDS SHOW LAST CREAT 4.0 IN 9/2020 AND HGB WAS 11.4 AT THAT TIME    HE WAS NOT ORTHOSTATIC  OCCASIONAL PVCS NOTED ON CARDIAC MONITOR    Medical decision making:  Patient presents with second syncopal episode in one week. No significant prodrome either time. Only occasional PVCs on monitor, no arrhythmias noted. Has worsening renal failure but no hyperkalemia or acidosis. Anemia worsening, likely aneia of chronic kidney disease. No neuro symptoms or deficits and no headache so not suspicious of CVA or SAH or ICH. No seizure activity noted with syncopal episodes. Has UTI but no fever or leukocytosis or hypotension to suggest sepsis. I spoke with Dr. Yfn Garcia. We thoroughly discussed the history, physical exam, laboratory and imaging studies, as well as, emergency department course. Based upon that discussion, we've decided to admit Clayton Hilario for further observation and evaluation of Estephania Brar's syncope and JALEN with CKD and PCKD. As I have deemed necessary from their history, physical, and studies, I have considered and evaluated Clayton Hilario for the following diagnoses: dehydration, JALEN, electrolyte disturbance, anemia, arrhythmia, orthostasis, ACS, PE, ICH, seizure, sepsis          Clinical Impression:  1. Syncope and collapse    2. Acute kidney injury superimposed on chronic kidney disease ([facility] [address]    3. Urinary tract infection with hematuria, site unspecified    4.  Anemia of chronic disease        Dispo: Patient will be  admitted at this time. Patient was informed of this decision and agrees with plan. I have discussed lab and xray findings with patient and they understand. Questions were answered to the best of my ability. Discharge vitals:  Blood pressure 118/64, pulse 82, temperature 98 °F (36.7 °C), temperature source Oral, resp. rate 17, weight 190 lb (86.2 kg), SpO2 94 %. Prescriptions given:   New Prescriptions    No medications on file       This chart was created using Dragon voice recognition software.         Talib Lobo MD  01/18/21 6961

## 2021-01-18 NOTE — ED NOTES
Pt informed about room number and told that RN would make him aware of eta for transport when it was arranged and pt sitting in bed talking with girlfriend on the phone and denies any needs at this time. Pt is alert and oriented and in no acute distress and sinus rhythm on the monitor. Pt has call light in reach and will make RN aware if he needs anything.      Luan Walker RN  01/18/21 0722

## 2021-01-18 NOTE — CONSULTS
Nephrology Consult Note  [phone]   [email or web address].        Reason for Consult:  JALEN, CKD    HISTORY OF PRESENT ILLNESS:                This is a patient with significant past medical history of ADPKD who presented with syncope. He follows with my partner Dr. Emily Dmuont and has CKD stage 4 with recent Cr values around 4.0 as an outpatient. Last imaging showed both kidneys around 20cm in size. He has been referred for renal transplant as well. He had LOC at home after a sexual encounter with his girlfriend. He had COVID 19 in December. Of note he also reports bright blood with stools on and off for 5 months. Past Medical History:        Diagnosis Date    Chronic kidney disease     Hematuria     Hepatitis C     Hypertension     Kidney failure     Overdose     Polycystic kidney        Past Surgical History:    History reviewed. No pertinent surgical history. Current Medications:    No current facility-administered medications on file prior to encounter. Current Outpatient Medications on File Prior to Encounter   Medication Sig Dispense Refill    ferrous sulfate (IRON 325) 325 (65 Fe) MG tablet Take 325 mg by mouth daily (with breakfast)      pantoprazole (PROTONIX) 40 MG tablet Take 40 mg by mouth daily      Cholecalciferol (VITAMIN D3) 50 MCG (2000 UT) CAPS Take 2,000 Units by mouth daily       sertraline (ZOLOFT) 100 MG tablet Take 100 mg by mouth daily      lisinopril (PRINIVIL;ZESTRIL) 5 MG tablet Take 5 mg by mouth daily      ondansetron (ZOFRAN-ODT) 4 MG disintegrating tablet Take 4 mg by mouth every 8 hours as needed for Nausea or Vomiting      amLODIPine (NORVASC) 5 MG tablet Take 5 mg by mouth 2 times daily       acetaminophen (TYLENOL) 325 MG tablet Take 650 mg by mouth every 6 hours as needed for Pain         Allergies:  Patient has no known allergies.     Social History:    Social History     Socioeconomic History    Marital status: Single Spouse name: Not on file    Number of children: Not on file    Years of education: Not on file    Highest education level: Not on file   Occupational History    Not on file   Social Needs    Financial resource strain: Not on file    Food insecurity     Worry: Not on file     Inability: Not on file    Transportation needs     Medical: Not on file     Non-medical: Not on file   Tobacco Use    Smoking status: Current Every Day Smoker     Packs/day: 1.00     Types: Cigarettes    Smokeless tobacco: Never Used   Substance and Sexual Activity    Alcohol use: Not Currently    Drug use: Not Currently     Comment: Hx of Overdose in [address] Sexual activity: Yes     Partners: Female   Lifestyle    Physical activity     Days per week: Not on file     Minutes per session: Not on file    Stress: Not on file   Relationships    Social connections     Talks on phone: Not on file     Gets together: Not on file     Attends L[employer]-day service: Not on file     Active member of club or organization: Not on file     Attends meetings of clubs or organizations: Not on file     Relationship status: Not on file    Intimate partner violence     Fear of current or ex partner: Not on file     Emotionally abused: Not on file     Physically abused: Not on file     Forced sexual activity: Not on file   Other Topics Concern    Not on file   Social History Narrative    Not on file       Family History:   His father has ADPKD as well. REVIEW OF SYSTEMS:    + headaches. Hx of substance abuse in past, no recent opiates. No chest pain. The remainder of the ROS is negative except per HPI. PHYSICAL EXAM:    Vitals:    BP (!) 145/83 Comment: nurse notified   Pulse 62   Temp 97.7 °F (36.5 °C) (Oral)   Resp 17   Wt 190 lb (86.2 kg)   SpO2 98%   BMI 22.53 kg/m²   I/O last 3 completed shifts: In: 240 [P.O.:240]  Out: 800 [Urine:800]  No intake/output data recorded. Physical Exam:  Gen: Resting in bed, NAD. HEENT: MMM, OP clear. CV: RRR no m/r. No S3.  Lungs: Clear bilaterally. No rhonchi. Abd: S/NT +BS  Ext: No edema, no cyanosis  Skin: Warm. No rashes appreciated. : No TTP over bladder, nondistended. Neuro: Alert and oriented x 3, nonfocal.  Joints: No erythema noted over joints. DATA:    CBC:   Lab Results   Component Value Date    WBC 8.4 01/18/2021    RBC 3.22 01/18/2021    HGB 9.9 01/18/2021    HCT 30.0 01/18/2021    MCV 92.4 01/18/2021    MCH 30.7 01/18/2021    MCHC 33.2 01/18/2021    RDW 13.4 01/18/2021     01/18/2021    MPV 10.2 01/18/2021     BMP:    Lab Results   Component Value Date     01/18/2021    K 4.4 01/18/2021     01/18/2021    CO2 21 01/18/2021    BUN 51 01/18/2021    LABALBU 4.2 01/18/2021    CREATININE 5.0 01/18/2021    CALCIUM 8.6 01/18/2021    GFRAA 17 01/18/2021    LABGLOM 14 01/18/2021    GLUCOSE 195 01/18/2021       IMPRESSION/RECOMMENDATIONS:      1. JALEN on CKD stage 4: He follows with my partner Dr. Brett Sánchez. Recent outpatient Cr has been closer to 4. Treating UTI, holding lisinopril. Trend Cr. 2. ADPKD. 3. Syncope: Per primary service. 4. UTI: Noted concern for STD as well. HIV sent. On azithro and ceftriaxone while urine culture is pending. 5. HTN: BP fair. Off lisinopril currently. 6. COVID PNA: In December. Now cleared. He did have some weight loss and some residual shortness of breath. 7. Anemia of CKD: Hgb around 10.    8. Hx of substance abuse: Utox negative. 9. Access: Avoid PICC lines given the high likelihood he will need arm accesses in the future. Thank you for allowing me to participate in the care of this patient. I will continue to follow along. Please call with questions.     Dereje Alex MD

## 2021-01-18 NOTE — H&P
Internal Medicine  History & Physical      CC : loss of consciousness    History Obtained From: Patient    HISTORY OF PRESENT ILLNESS:  Heber Cabrera is a 27year old male, PMH of PCKD (diagnosed age 25), CKD, Hep C, IVDU (stopped 1 year ago), HTN, Hematuria, 12 ppy smoking history (current smoker), presenting today with an episode of loss of consciousness. He states that yesterday, while he was having a sexual encounter with his girlfriend, he had a panic attack. He stood up, felt light headed, had tunnel vision and SOB and promptly had a syncopal episode. He states that he fell but did not hit his head and does not report any bodily injuries and a headache for 1 hour after the episode. This is the second time having such an episode. The first was on 1/12/21, where he came to the ED but left AMA before blood work could be done. This time, he denies any fevers, chills, palpitations, urine changes. He states that he has had decreased appetite for the past 3 months but does not understand why. He eats one meal a day and feels full quicker. He has lost 16 pounds in the last 2 months. He was diagnosed with Covid 19 in late December and states that he has residual SOB and weakness, and 5 episodes of paroxysmal cough per day, after which he feels light headed. He has not been able to take his medications as prescribed due to excessive somnolence and generally feels dizzy after missing a few doses of Zoloft. He also describes bright red blood per rectum with each bowel movement which has been happening for the past 5 months. He has pain at 4-5/10 with defecation and sometimes feels a ripping sensation. He has not had a colonoscopy done yet and denies a family history of colon cancer or IBD. Last HIV test was negative but he is unsure when that was. He consents to getting another one done.         Past Medical History:        Diagnosis Date    Chronic kidney disease     Hematuria     Hepatitis C     Hypertension  Kidney failure     Overdose     Polycystic kidney    ·     Past Surgical History:    · History reviewed. No pertinent surgical history. Medications Priorto Admission:    · Medications Prior to Admission: ferrous sulfate (IRON 325) 325 (65 Fe) MG tablet, Take 325 mg by mouth daily (with breakfast)  · pantoprazole (PROTONIX) 40 MG tablet, Take 40 mg by mouth daily  · Cholecalciferol (VITAMIN D3) 50 MCG (2000 UT) CAPS, Take by mouth  · sertraline (ZOLOFT) 100 MG tablet, Take 100 mg by mouth daily  · lisinopril (PRINIVIL;ZESTRIL) 5 MG tablet, Take 5 mg by mouth daily  · ondansetron (ZOFRAN-ODT) 4 MG disintegrating tablet, Take 4 mg by mouth every 8 hours as needed for Nausea or Vomiting  · amLODIPine (NORVASC) 5 MG tablet, Take 5 mg by mouth daily  · acetaminophen (TYLENOL) 325 MG tablet, Take 650 mg by mouth every 6 hours as needed for Pain    Allergies:  Patient has no known allergies. Social History:   · TOBACCO:   reports that he has been smoking cigarettes. He has been smoking about 1.00 pack per day. He has never used smokeless tobacco. 12 ppy history  · ETOH:   reports previous alcohol use. social alcohol drinker  · DRUGS : Stopped using heroin IVDU one year ago, denies using any other drugs  · Patient currently lives at home with his girlfriend  ·   Family History:   · History reviewed. No pertinent family history. Physical Exam  Constitutional:       General: He is not in acute distress. Appearance: Normal appearance. He is normal weight. HENT:      Head: Normocephalic and atraumatic. Nose: Nose normal.      Mouth/Throat:      Mouth: Mucous membranes are moist.      Pharynx: Oropharynx is clear. No posterior oropharyngeal erythema. Eyes:      Extraocular Movements: Extraocular movements intact. Conjunctiva/sclera: Conjunctivae normal.      Pupils: Pupils are equal, round, and reactive to light.    Neck: Musculoskeletal: Normal range of motion and neck supple. No neck rigidity or muscular tenderness. Cardiovascular:      Rate and Rhythm: Normal rate and regular rhythm. Pulses: Normal pulses. Heart sounds: Normal heart sounds. No murmur. No gallop. Pulmonary:      Effort: Pulmonary effort is normal. No respiratory distress. Breath sounds: Normal breath sounds. No wheezing. Chest:      Chest wall: No tenderness. Abdominal:      General: Abdomen is flat. Palpations: Abdomen is soft. Tenderness: There is no abdominal tenderness. Musculoskeletal:         General: No swelling or tenderness. Right lower leg: No edema. Left lower leg: No edema. Skin:     Coloration: Skin is not jaundiced. Neurological:      General: No focal deficit present. Mental Status: He is alert and oriented to person, place, and time. Psychiatric:         Mood and Affect: Mood normal.         Behavior: Behavior normal.       Physical exam:       Vitals:    01/18/21 0853   BP: 132/63   Pulse: 67   Resp: 16   Temp: 99.3 °F (37.4 °C)   SpO2: 96%       Review of Systems   Constitutional: Positive for appetite change and fatigue. Negative for chills, diaphoresis and fever. HENT: Negative for congestion, hearing loss, mouth sores, nosebleeds, rhinorrhea, sinus pressure, sneezing, sore throat, tinnitus and trouble swallowing. Eyes: Negative for photophobia, pain, discharge, itching and visual disturbance. Respiratory: Positive for cough and shortness of breath. Negative for apnea, choking, chest tightness and stridor. Cardiovascular: Negative for chest pain, palpitations and leg swelling. Gastrointestinal: Positive for anal bleeding, blood in stool and constipation. Negative for abdominal distention, abdominal pain, diarrhea, nausea and vomiting. Genitourinary: Positive for hematuria. Negative for difficulty urinating, discharge, dysuria, enuresis and flank pain. Musculoskeletal: Negative for arthralgias and myalgias. Skin: Negative for rash and wound. Neurological: Positive for dizziness, light-headedness and headaches. Negative for seizures, facial asymmetry and numbness. ROS: A 10 point review of systems was conducted, significant findings as noted in HPI. DATA:    Labs:  CBC:   Recent Labs     01/18/21 0410   WBC 8.4   HGB 9.9*   HCT 29.7*   *       BMP:   Recent Labs     01/18/21 0410      K 4.4      CO2 21   BUN 51*   CREATININE 5.0*   GLUCOSE 195*     LFT's:   Recent Labs     01/18/21 0410   AST 15   ALT 19   BILITOT <0.2   ALKPHOS 58     Troponin: No results for input(s): TROPONINI in the last 72 hours. BNP:No results for input(s): BNP in the last 72 hours. ABGs: No results for input(s): PHART, ZIO0UBA, PO2ART in the last 72 hours. INR: No results for input(s): INR in the last 72 hours. U/A:  Recent Labs     01/18/21 0410   COLORU Yellow   PHUR 6.0  6.0   WBCUA 21-50*   RBCUA *   BACTERIA Rare*   CLARITYU SL CLOUDY*   SPECGRAV 1.025   LEUKOCYTESUR TRACE*   UROBILINOGEN 0.2   BILIRUBINUR Negative   BLOODU LARGE*   GLUCOSEU Negative       CT CHEST ABDOMEN PELVIS WO CONTRAST   Final Result   Addendum 1 of 1   ADDENDUM #1    Addendum: There is a typographical error in the impression section of the    chest. There is a large bulla in the right upper lobe. There is no    pneumothorax. This is correctly stated in the body of the report. 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 Less likely pneumothorax. Recommend CT of the chest for better    characterization.               Assessment/Plan: Bernadette Black is a 27year old male, PMH of PCKD (diagnosed age 25), CKD, Hep C, IVDU (stopped 1 year ago), HTN, Hematuria, 12 ppy smoking history (current smoker), presenting today with an episode of loss of consciousness. He states that yesterday, while he was having a sexual encounter with his girlfriend, he had a panic attack. He stood up, felt light headed, had tunnel vision and SOB and promptly had a syncopal episode. He states that he fell but did not hit his head and does not report any bodily injuries and a headache for 1 hour after the episode. #Syncope - vasovagal episode vs panic attack, superimposed on anemia  Patient recently diagnosed with covid 19 and has been having paroxysmal coughing episodes with SOB, lightheadedness and dizziness. He is afebrile, vitals WNL, BP elevated at 174/88 on admission, currently 132/63. He has recently had decreased oral intake. Hg is 9.9 and patient has been having BRBPR for last 5 months. EKG shows NSR. Patient had syncopal episode as he stood up. He has recently diagnosed with CKD and states that it has deeply affected his mood, which could increase his risk of a panic attack. - Orthostatic blood pressure  - ECHO  - telemetry  - morning serum cortisol level    #BRBPR - anal fissure vs colon cancer vs ulcerative colitis  BRBPR for last 5 months, weight loss of 16 pounds over the course of 2 months, decreased appetite for last 3 months due to a feeling of fullness. Hg of 9.9 today. Has not had a colonoscopy done as of yet due to lack of insurance. History of IVDU. - colonoscopy  - monitor for acute blood loss    #UTI  U/A shows cloudy, large amount of blood, trace leukocytes. Patient does not endorse UTI symptoms. CVA tenderness negative, afebrile.  Urine culture pending.  - ceftriaxone (Day 1)    #STD  - azithromycin 1000 mg once  - HIV testing (patient has given consent)    #SOB - Post Covid 19 symptoms Large bullous emphysema in RUL, 15 mm and mild centrilobular/paraseptal emphysema. No pneumothorax on CT. Lungs clear to auscultation. Patient afebrile and WBC WNL. Likely residual symptoms of covid 19.  - will monitor     #decreased appetite  Decreased appetite for the past 3 months. Feels full after eating one meal. Lost 16 pounds unintentionally in last 2 months.   - pantoprazole 40 mg daily  - upper endoscopy    #PCKD, CKD, HTN  - amlodipine 5 mg daily  - lisinopril held  - nephrology consulted    This patient has been staffed and discussed with Albina Robles MD.     Code Status: Full  FEN: renal diet    PPX: heparin  DISPO: Home    Written by Aman Strange MS4

## 2021-01-18 NOTE — CONSULTS
Clinical Pharmacy Progress Note  Medication History     Admit Date: 1/18/2021    Asked by Dr. Kirstie Silver to clarify home medications. List of current medications patient is taking is complete. Home medication list in EPIC updated to reflect changes noted below. Source of information: patient, outpatient fill records      The following medication instructions/doses were adjusted to reflect how patient is taking:  Amlodipine changed from 5mg daily to 5mg BID       Please call with questions.   Lynne Zee PharmD, [address] [place] [address] Pharmacy: [phone]  [address] [place] [address]: [phone]  1/18/2021 10:58 AM

## 2021-01-19 VITALS
TEMPERATURE: 97.9 F | HEIGHT: 77 IN | OXYGEN SATURATION: 99 % | SYSTOLIC BLOOD PRESSURE: 145 MMHG | BODY MASS INDEX: 22.43 KG/M2 | DIASTOLIC BLOOD PRESSURE: 80 MMHG | RESPIRATION RATE: 16 BRPM | WEIGHT: 190 LBS | HEART RATE: 54 BPM

## 2021-01-19 LAB
ANION GAP SERPL CALCULATED.3IONS-SCNC: 7 MMOL/L (ref 3–16)
BASOPHILS ABSOLUTE: 0 K/UL (ref 0–0.2)
BASOPHILS RELATIVE PERCENT: 0.6 %
BUN BLDV-MCNC: 41 MG/DL (ref 7–20)
CALCIUM SERPL-MCNC: 8.5 MG/DL (ref 8.3–10.6)
CHLORIDE BLD-SCNC: 111 MMOL/L (ref 99–110)
CO2: 22 MMOL/L (ref 21–32)
CREAT SERPL-MCNC: 4.4 MG/DL (ref 0.9–1.3)
EOSINOPHILS ABSOLUTE: 0.2 K/UL (ref 0–0.6)
EOSINOPHILS RELATIVE PERCENT: 3.5 %
GFR AFRICAN AMERICAN: 19
GFR NON-AFRICAN AMERICAN: 16
GLUCOSE BLD-MCNC: 91 MG/DL (ref 70–99)
HCT VFR BLD CALC: 29.6 % (ref 40.5–52.5)
HEMOGLOBIN: 10.1 G/DL (ref 13.5–17.5)
HIV AG/AB: NORMAL
HIV ANTIGEN: NORMAL
HIV-1 ANTIBODY: NORMAL
HIV-2 AB: NORMAL
LYMPHOCYTES ABSOLUTE: 1.3 K/UL (ref 1–5.1)
LYMPHOCYTES RELATIVE PERCENT: 27.3 %
MCH RBC QN AUTO: 31 PG (ref 26–34)
MCHC RBC AUTO-ENTMCNC: 34.1 G/DL (ref 31–36)
MCV RBC AUTO: 90.8 FL (ref 80–100)
MONOCYTES ABSOLUTE: 0.4 K/UL (ref 0–1.3)
MONOCYTES RELATIVE PERCENT: 9 %
NEUTROPHILS ABSOLUTE: 2.7 K/UL (ref 1.7–7.7)
NEUTROPHILS RELATIVE PERCENT: 59.6 %
PDW BLD-RTO: 13.2 % (ref 12.4–15.4)
PLATELET # BLD: 132 K/UL (ref 135–450)
PMV BLD AUTO: 10.6 FL (ref 5–10.5)
POTASSIUM REFLEX MAGNESIUM: 5 MMOL/L (ref 3.5–5.1)
RBC # BLD: 3.26 M/UL (ref 4.2–5.9)
SODIUM BLD-SCNC: 140 MMOL/L (ref 136–145)
URINE CULTURE, ROUTINE: NORMAL
WBC # BLD: 4.6 K/UL (ref 4–11)

## 2021-01-19 PROCEDURE — 85025 COMPLETE CBC W/AUTO DIFF WBC: CPT

## 2021-01-19 PROCEDURE — 80048 BASIC METABOLIC PNL TOTAL CA: CPT

## 2021-01-19 PROCEDURE — 96372 THER/PROPH/DIAG INJ SC/IM: CPT

## 2021-01-19 PROCEDURE — 96366 THER/PROPH/DIAG IV INF ADDON: CPT

## 2021-01-19 PROCEDURE — 6370000000 HC RX 637 (ALT 250 FOR IP): Performed by: INTERNAL MEDICINE

## 2021-01-19 PROCEDURE — 2580000003 HC RX 258: Performed by: INTERNAL MEDICINE

## 2021-01-19 PROCEDURE — 93242 EXT ECG>48HR<7D RECORDING: CPT | Performed by: INTERNAL MEDICINE

## 2021-01-19 PROCEDURE — G0378 HOSPITAL OBSERVATION PER HR: HCPCS

## 2021-01-19 PROCEDURE — 36415 COLL VENOUS BLD VENIPUNCTURE: CPT

## 2021-01-19 PROCEDURE — 6360000002 HC RX W HCPCS: Performed by: INTERNAL MEDICINE

## 2021-01-19 PROCEDURE — 96361 HYDRATE IV INFUSION ADD-ON: CPT

## 2021-01-19 RX ORDER — AMOXICILLIN AND CLAVULANATE POTASSIUM 500; 125 MG/1; MG/1
1 TABLET, FILM COATED ORAL DAILY
Qty: 7 TABLET | Refills: 0 | Status: SHIPPED | OUTPATIENT
Start: 2021-01-19 | End: 2021-01-26

## 2021-01-19 RX ADMIN — FERROUS SULFATE TAB 325 MG (65 MG ELEMENTAL FE) 325 MG: 325 (65 FE) TAB at 08:34

## 2021-01-19 RX ADMIN — PANTOPRAZOLE SODIUM 40 MG: 40 TABLET, DELAYED RELEASE ORAL at 08:34

## 2021-01-19 RX ADMIN — CEFTRIAXONE 1 G: 1 INJECTION, POWDER, FOR SOLUTION INTRAMUSCULAR; INTRAVENOUS at 05:12

## 2021-01-19 RX ADMIN — HEPARIN SODIUM 5000 UNITS: 5000 INJECTION INTRAVENOUS; SUBCUTANEOUS at 05:12

## 2021-01-19 RX ADMIN — SERTRALINE HYDROCHLORIDE 100 MG: 100 TABLET ORAL at 08:34

## 2021-01-19 RX ADMIN — AMLODIPINE BESYLATE 5 MG: 5 TABLET ORAL at 08:33

## 2021-01-19 NOTE — PROGRESS NOTES
Patient A&O, VSS w/ exception to elevated BP. Scheduled oral BP medication administered per orders. No reports of dizziness or light headiness when ambulating. Cardiac NP at bedside this AM to place remote cardiac monitor. Patient denies any further needs at this time. Bed is in the lowest position, bed alarm on, patient call light and bedside table are within reach. Will continue to monitor for changes in patient status.     BP (!) 145/80   Pulse 54   Temp 97.9 °F (36.6 °C) (Oral)   Resp 16   Ht 6' 5\" (1.956 m)   Wt 190 lb (86.2 kg)   SpO2 99%   BMI 22.53 kg/m²     Electronically signed by Marci Huber on 1/19/2021 at 10:18 AM

## 2021-01-19 NOTE — DISCHARGE SUMMARY
Discharge Summary    Date:1/19/2021        Patient Manohar Cote     YOB: 1990     Age:30 y.o. Admit Date:1/18/2021   Admission Condition:good   Discharged Condition:good  Discharge Date: 01/19/21    Discharge Diagnoses   Principal Problem:    Syncope  Active Problems:    Acute kidney injury superimposed on CKD (HCC)    Polycystic kidney    UTI (urinary tract infection)    Syncope and collapse  Resolved Problems:    * No resolved hospital problems. [facility] Stay   Narrative of Hospital Course:     29-year-old male presented with a syncopal event. Patient was admitted. Telemetry showed normal sinus rhythm. EKG showed normal sinus rhythm. Troponin were negative. Echo showed normal ejection fraction. Cardiology was consulted, I have discussed with the cardiology, patient is okay for discharge. Cardiology to arrange for event monitor. I believe his syncope was likely vagal in nature associated with the bouts of cough. He also had JALEN on superimposed CKD secondary to polycystic kidney disease. Nephrology was consulted. Patient was given IV fluids. His creatinine came down close to baseline. I have discussed with the nephrology he will be discharged off lisinopril. He also had evidence of UTI, probe for chlamydia and gonorrhea was negative. He will be given p.o. antibiotics on discharge. On dc Patient denies any CP,SOB,Denies any nausea, vomiting, abdominal pain. Denies any diarrhea. Patient denies any palpitations, lightheadedness, orthopnea, PND. Patient doesn't appear to be in any distress on discharge .         Physical exam   Vitals:    01/18/21 1940 01/19/21 0020 01/19/21 0420 01/19/21 0801   BP: (!) 150/69 (!) 146/68 (!) 155/80 (!) 145/80   Pulse: 56 52 59 54   Resp: 18 18 17 16   Temp: 98 °F (36.7 °C) 97.3 °F (36.3 °C) 97.5 °F (36.4 °C) 97.9 °F (36.6 °C)   TempSrc: Oral Oral Oral Oral   SpO2: 99% 98% 98% 99%   Weight:       Height:             Physical Exam Constitutional:       Appearance: Normal appearance. HENT:      Head: Normocephalic and atraumatic. Neck:      Musculoskeletal: Normal range of motion and neck supple. Cardiovascular:      Rate and Rhythm: Normal rate and regular rhythm. Pulses: Normal pulses. Heart sounds: Normal heart sounds. Pulmonary:      Effort: Pulmonary effort is normal.      Breath sounds: Normal breath sounds. Abdominal:      General: Abdomen is flat. Palpations: Abdomen is soft. Musculoskeletal: Normal range of motion. Skin:     General: Skin is warm and dry. Capillary Refill: Capillary refill takes less than 2 seconds. Neurological:      General: No focal deficit present. Mental Status: He is alert and oriented to person, place, and time. Psychiatric:         Mood and Affect: Mood normal.         Behavior: Behavior normal.           Consultants:   IP CONSULT TO PHARMACY  IP CONSULT TO NEPHROLOGY  IP CONSULT TO CARDIOLOGY    Time Spent on Discharge:  35 minutes were spent in patient examination, evaluation, counseling as well as medication reconciliation, prescriptions for required medications, discharge plan and follow up. Surgeries/Procedures Performed:            Significant Diagnostic Studies:   Recent Labs:  CBC:   Recent Labs     01/18/21  0410 01/18/21  1349 01/19/21  0510   WBC 8.4  --  4.6   HGB 9.9*  --  10.1*   HCT 29.7* 30.0* 29.6*   MCV 92.4  --  90.8   *  --  132*     BMP:   Recent Labs     01/18/21  0410 01/19/21  0510    140   K 4.4 5.0    111*   CO2 21 22   BUN 51* 41*   CREATININE 5.0* 4.4*     Mag:   Lab Results   Component Value Date    MG 2.20 01/18/2021     LIVER PROFILE:   Recent Labs     01/18/21  0410   AST 15   ALT 19   BILITOT <0.2   ALKPHOS 58     PT/INR: No results for input(s): PROTIME, INR in the last 72 hours. APTT: No results for input(s): APTT in the last 72 hours. BNP:  No results for input(s): BNP in the last 72 hours. CARDIAC ENZYMES: No results for input(s): CKTOTAL, CKMB, TROPONINI in the last 72 hours. Radiology Last 7 Days:  CT CHEST ABDOMEN PELVIS WO CONTRAST   Final Result   Addendum 1 of 1      Addendum: There is a typographical error in the impression section of the    chest. There is a large bulla in the right upper lobe. There is no    pneumothorax. This is correctly stated in the body of the report. 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 Less likely pneumothorax. Recommend CT of the chest for better    characterization. Discharge Plan   Disposition: Home    Provider Follow-Up:   No follow-up provider specified. Hospital/Incidental Findings Requiring Follow-Up:  CT CHEST ABDOMEN PELVIS WO CONTRAST   Final Result   Addendum 1 of 1      Addendum: There is a typographical error in the impression section of the    chest. There is a large bulla in the right upper lobe. There is no    pneumothorax. This is correctly stated in the body of the report. 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 Less likely pneumothorax. Recommend CT of the chest for better    characterization. Discharge Medications         Medication List      START taking these medications    amoxicillin-clavulanate 500-125 MG per tablet  Commonly known as:  Augmentin  Take 1 tablet by mouth daily for 7 days  Notes to patient: Amoxicillin (Amoxil)  USE-- Treats bacterial infections (antibiotic)  SIDE EFFECTS-- Upset stomach, diarrhea        CONTINUE taking these medications    acetaminophen 325 MG tablet  Commonly known as: TYLENOL     amLODIPine 5 MG tablet  Commonly known as: NORVASC     ferrous sulfate 325 (65 Fe) MG tablet  Commonly known as: IRON 325     ondansetron 4 MG disintegrating tablet  Commonly known as: ZOFRAN-ODT pantoprazole 40 MG tablet  Commonly known as: PROTONIX     sertraline 100 MG tablet  Commonly known as: ZOLOFT     Vitamin D3 50 MCG (2000 UT) Caps        STOP taking these medications    lisinopril 5 MG tablet  Commonly known as: PRINIVIL;ZESTRIL           Where to Get Your Medications      These medications were sent to [place], [address] [facility] [phone] - F [phone]  [address] [facility] [address]    Phone: [phone]   · amoxicillin-clavulanate 500-125 MG per tablet         Electronically signed by Aba Funes MD on 1/19/21 at 3:34 PM EST

## 2021-01-19 NOTE — PROGRESS NOTES
Pt alert and oriented. VSS with BP slightly on the higher side, evening Norvasc given, see MAR. Pt voiding well. Pt tolerating diet. Pt denies pain. Pt has IVF infusing per orders, see MAR. Pt resting comfortably in bed. Pt has call light within reach, bed in lowest position with wheels locked, 2/4 side rails up, and bed alarm on. Will continue to monitor.

## 2021-01-19 NOTE — CARE COORDINATION
Cm following, pt form home with GF independent PTA. Pt plans to return home no needs anticipated at this time.   Cont work up for HIV, plan EGD, Nephrology consult for CKD, +COVID end of Dec.   Electronically signed by Lori Toney RN on 1/18/2021 at 1:12 PM  [phone]
Notification completed in HENS/PAS?:  Not Applicable    IMM Completed:   Not Indicated    Transportation:  Transportation PLAN for discharge: family   Mode of Transport: [address] ordered at discharge: Not [address]: Not Applicable  Orders faxed: No    Durable Medical Equipment:  DME Provider: none  Equipment obtained during hospitalization:     Home Oxygen and Respiratory Equipment:  Oxygen needed at discharge?: Not [address]: Not Applicable  Portable tank available for discharge?: Not Indicated    Dialysis:  Dialysis patient: No    Dialysis Center:  Not Applicable      Additional CM Notes: Pt from home no needs at DC family will drive home    The Plan for Transition of Care is related to the following treatment goals of Syncope and collapse [R55]    The Patient and/or patient representative Malou Calhoun and his family were provided with a choice of provider and agrees with the discharge plan Yes    Freedom of choice list was provided with basic dialogue that supports the patient's individualized plan of care/goals and shares the quality data associated with the providers.  Yes    Care Transitions patient: No    Richie Hicks RN  [facility] [employer]  Case Management Department  Ph: [phone]  Fax: [phone]

## 2021-01-19 NOTE — PROGRESS NOTES
48 hour zio monitor placed per Dr. Ke Fagan for syncope. Pt educated and all questions answered. Bed side nurse made aware.

## 2021-01-19 NOTE — PROGRESS NOTES
Discharge Progress Note  1/19/2021 11:08 AM    Data:  Discharge order received. Action:  IV D/C'd without difficulty. See Doc Flowsheets for assessment. Patient given discharge instructions with prescriptions. Response:  Patient verbalized understanding of discharge instructions. Patient left with all belongings.     Lizzy Zhou  ________________________________________________________________________

## 2021-01-19 NOTE — PROGRESS NOTES
Nephrology Progress Note  [phone]   [email or web address]    Patient:  Antionette Scott   : 1990    CC:  JALEN, CKD    Subjective:  No new complaints. He is hungry. ROS:   No flank pain. No N/V. No chest pain. SHx:  No visitors this morning. Meds:  Scheduled Meds:   sertraline  100 mg Oral Daily    amLODIPine  5 mg Oral BID    ferrous sulfate  325 mg Oral Daily with breakfast    pantoprazole  40 mg Oral Daily    sodium chloride flush  10 mL Intravenous 2 times per day    heparin (porcine)  5,000 Units Subcutaneous 3 times per day    cefTRIAXone (ROCEPHIN) IV  1 g Intravenous Q24H     Continuous Infusions:   sodium chloride 100 mL/hr at 21 1048     PRN Meds:.ondansetron, sodium chloride flush, promethazine **OR** ondansetron, polyethylene glycol, acetaminophen **OR** acetaminophen    Vitals:  BP (!) 155/80   Pulse 59   Temp 97.5 °F (36.4 °C) (Oral)   Resp 17   Ht 6' 5\" (1.956 m)   Wt 190 lb (86.2 kg)   SpO2 98%   BMI 22.53 kg/m²     Physical Exam:  Gen: Resting in bed, NAD. HEENT: MMM, OP clear. CV: RRR, no S3.  Lungs: good respiratory effort and clear air entry   Abd: S/NT +BS  Ext: No edema, no cyanosis  Skin: Warm. No rashes appreciated.     Labs:  CBC:   Lab Results   Component Value Date    WBC 4.6 2021    RBC 3.26 2021    HGB 10.1 2021    HCT 29.6 2021    MCV 90.8 2021    MCH 31.0 2021    MCHC 34.1 2021    RDW 13.2 2021     2021    MPV 10.6 2021     BMP:    Lab Results   Component Value Date     2021    K 5.0 2021     2021    CO2 22 2021    BUN 41 2021    LABALBU 4.2 2021    CREATININE 4.4 2021    CALCIUM 8.5 2021    GFRAA 19 2021    LABGLOM 16 2021    GLUCOSE 91 2021       Assessment/Plan: 1.  JALEN on CKD stage 4: He follows with my partner Dr. Marya Luis in [place] (just recently moved from [place] to [facility]). Recent outpatient Cr has been closer to 4. Treating UTI, holding lisinopril. Cr better today. Stop IV fluids this afternoon.     2. ADPKD.     3. Syncope: Per primary service.     4. UTI: Noted concern for STD as well. HIV sent. On azithro and ceftriaxone while urine culture is pending.     5. HTN: BP up while off some antihypertensives and on IV fluids. Off lisinopril currently. BP should improve once we stop his IV fluids.     6. COVID PNA: In December. Now cleared. He did have some weight loss and some residual shortness of breath.     7. Anemia of CKD: Hgb around 10.     8. Hx of substance abuse: Utox negative.     9. Access: Avoid PICC lines given the high likelihood he will need an arm access in the future for dialysis.     Ana Vences MD  [facility]  Office Number: [phone]

## 2021-01-19 NOTE — PROGRESS NOTES
Internal Medicine  Progress Note    Admit Date: 1/18/2021  Diet: DIET RENAL;    CC: syncope    Interval history:   Overnight, no acute events and the patient states that he feels well. Patient reports having no episodes of dizziness, loss of consciousness, palpitations, headache or fever/chills. Medications:     Scheduled Meds:   sertraline  100 mg Oral Daily    amLODIPine  5 mg Oral BID    ferrous sulfate  325 mg Oral Daily with breakfast    pantoprazole  40 mg Oral Daily    sodium chloride flush  10 mL Intravenous 2 times per day    heparin (porcine)  5,000 Units Subcutaneous 3 times per day    cefTRIAXone (ROCEPHIN) IV  1 g Intravenous Q24H     Continuous Infusions:   sodium chloride 100 mL/hr at 01/18/21 1048     PRN Meds:ondansetron, sodium chloride flush, promethazine **OR** ondansetron, polyethylene glycol, acetaminophen **OR** acetaminophen    Objective:   Vitals:   T-max:  Patient Vitals for the past 8 hrs:   BP Temp Temp src Pulse Resp SpO2   01/19/21 0801 (!) 145/80 97.9 °F (36.6 °C) Oral 54 16 99 %   01/19/21 0420 (!) 155/80 97.5 °F (36.4 °C) Oral 59 17 98 %       Intake/Output Summary (Last 24 hours) at 1/19/2021 0844  Last data filed at 1/19/2021 0801  Gross per 24 hour   Intake 2065 ml   Output 2600 ml   Net -535 ml       Physical Exam  Constitutional:       General: He is not in acute distress. Appearance: Normal appearance. He is normal weight. He is not ill-appearing. HENT:      Head: Normocephalic and atraumatic. Eyes:      Extraocular Movements: Extraocular movements intact. Pupils: Pupils are equal, round, and reactive to light. Neck:      Musculoskeletal: Normal range of motion. Cardiovascular:      Rate and Rhythm: Normal rate and regular rhythm. Pulses: Normal pulses. Heart sounds: No murmur. Friction rub present. No gallop. Pulmonary:      Effort: Pulmonary effort is normal. No respiratory distress. Breath sounds: Normal breath sounds. No stridor. No wheezing or rales. Chest:      Chest wall: No tenderness. Abdominal:      General: Abdomen is flat. Palpations: Abdomen is soft. Tenderness: There is no abdominal tenderness. There is no guarding. Musculoskeletal:      Right lower leg: No edema. Left lower leg: No edema. Skin:     Coloration: Skin is not jaundiced or pale. Neurological:      General: No focal deficit present. Mental Status: He is alert and oriented to person, place, and time. Psychiatric:         Mood and Affect: Mood normal.         Behavior: Behavior normal.         Review of Systems  - Negative except Interval History    LABS:    CBC:   Recent Labs     01/18/21  0410 01/18/21  1349 01/19/21  0510   WBC 8.4  --  4.6   HGB 9.9*  --  10.1*   HCT 29.7* 30.0* 29.6*   *  --  132*   MCV 92.4  --  90.8     Renal:    Recent Labs     01/18/21  0410 01/19/21  0510    140   K 4.4 5.0    111*   CO2 21 22   BUN 51* 41*   CREATININE 5.0* 4.4*   GLUCOSE 195* 91   CALCIUM 8.6 8.5   MG 2.20  --    ANIONGAP 12 7     Hepatic:   Recent Labs     01/18/21 0410   AST 15   ALT 19   BILITOT <0.2   PROT 7.2   LABALBU 4.2   ALKPHOS 58     Troponin: No results for input(s): TROPONINI in the last 72 hours. BNP: No results for input(s): BNP in the last 72 hours. Lipids: No results for input(s): CHOL, HDL in the last 72 hours. Invalid input(s): LDLCALCU, TRIGLYCERIDE  ABGs:  No results for input(s): PHART, ZQP9XOG, PO2ART, RVP5WUJ, BEART, THGBART, L3PXLUPX, JFS1AIQ in the last 72 hours. INR: No results for input(s): INR in the last 72 hours. Lactate: No results for input(s): LACTATE in the last 72 hours.   Cultures:  -----------------------------------------------------------------  RAD:   CT CHEST ABDOMEN PELVIS WO CONTRAST   Final Result   Addendum 1 of 1      Addendum: There is a typographical error in the impression section of the chest. There is a large bulla in the right upper lobe. There is no    pneumothorax. This is correctly stated in the body of the report. 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 Less likely pneumothorax. Recommend CT of the chest for better    characterization. Assessment/Plan:     Natali Babb is a 27year old male, PMH of PCKD (diagnosed age 25), CKD, Hep C, IVDU (stopped 1 year ago), HTN, Hematuria, 12 ppy smoking history (current smoker), presenting today with an episode of loss of consciousness. He states that yesterday, while he was having a sexual encounter with his girlfriend, he had a panic attack. He stood up, felt light headed, had tunnel vision and SOB and promptly had a syncopal episode. He states that he fell but did not hit his head and does not report any bodily injuries and a headache for 1 hour after the episode.      #Syncope - vasovagal episode vs panic attack, superimposed on anemia  Patient recently diagnosed with covid 19 and has been having paroxysmal coughing episodes with SOB, lightheadedness and dizziness. He is afebrile, vitals WNL, BP elevated at 174/88 on admission, currently 132/63. He has recently had decreased oral intake. Hg is 9.9 and patient has been having BRBPR for last 5 months. EKG shows NSR. Echo shows EF of 55-60%, no wall motion abnormalities. Patient had syncopal episode as he stood up. He has recently diagnosed with CKD and states that it has deeply affected his mood, which could increase his risk of a panic attack. - Orthostatic blood pressure  - telemetry     #BRBPR - anal fissure vs colon cancer vs ulcerative colitis  BRBPR for last 5 months, weight loss of 16 pounds over the course of 2 months, decreased appetite for last 3 months due to a feeling of fullness. Hg of 9.9 today. Has not had a colonoscopy done as of yet due to lack of insurance. History of IVDU.

## 2021-01-19 NOTE — PLAN OF CARE
Problem: Falls - Risk of:  Goal: Will remain free from falls  Description: Will remain free from falls  1/18/2021 2146 by Maldonado Pandya RN  Outcome: Ongoing   Pt has non skid socks on, call light within reach, bed in lowest position with wheels locked, 2/4 side rails up, and bed alarm on.

## 2021-02-07 ENCOUNTER — HOSPITAL ENCOUNTER (EMERGENCY)
Age: 31
Discharge: HOME | End: 2021-02-07
Attending: EMERGENCY MEDICINE
Payer: COMMERCIAL

## 2021-02-07 VITALS
TEMPERATURE: 98.3 F | HEIGHT: 77 IN | BODY MASS INDEX: 22.43 KG/M2 | OXYGEN SATURATION: 99 % | SYSTOLIC BLOOD PRESSURE: 157 MMHG | WEIGHT: 190 LBS | RESPIRATION RATE: 18 BRPM | HEART RATE: 66 BPM | DIASTOLIC BLOOD PRESSURE: 86 MMHG

## 2021-02-07 DIAGNOSIS — R04.0 EPISTAXIS: ICD-10-CM

## 2021-02-07 DIAGNOSIS — H92.20 BLEEDING FROM EAR, UNSPECIFIED LATERALITY: Primary | ICD-10-CM

## 2021-02-07 LAB
BASOPHILS ABSOLUTE: 0 K/UL (ref 0–0.2)
BASOPHILS RELATIVE PERCENT: 0.8 %
EOSINOPHILS ABSOLUTE: 0.2 K/UL (ref 0–0.6)
EOSINOPHILS RELATIVE PERCENT: 3 %
HCT VFR BLD CALC: 29 % (ref 40.5–52.5)
HEMOGLOBIN: 9.6 G/DL (ref 13.5–17.5)
LYMPHOCYTES ABSOLUTE: 1.5 K/UL (ref 1–5.1)
LYMPHOCYTES RELATIVE PERCENT: 27.5 %
MCH RBC QN AUTO: 30.5 PG (ref 26–34)
MCHC RBC AUTO-ENTMCNC: 33.2 G/DL (ref 31–36)
MCV RBC AUTO: 91.9 FL (ref 80–100)
MONOCYTES ABSOLUTE: 0.4 K/UL (ref 0–1.3)
MONOCYTES RELATIVE PERCENT: 7.4 %
NEUTROPHILS ABSOLUTE: 3.4 K/UL (ref 1.7–7.7)
NEUTROPHILS RELATIVE PERCENT: 61.3 %
PDW BLD-RTO: 13.6 % (ref 12.4–15.4)
PLATELET # BLD: 148 K/UL (ref 135–450)
PMV BLD AUTO: 9.4 FL (ref 5–10.5)
RBC # BLD: 3.15 M/UL (ref 4.2–5.9)
WBC # BLD: 5.6 K/UL (ref 4–11)

## 2021-02-07 PROCEDURE — 85025 COMPLETE CBC W/AUTO DIFF WBC: CPT

## 2021-02-07 PROCEDURE — 99283 EMERGENCY DEPT VISIT LOW MDM: CPT

## 2021-02-07 ASSESSMENT — PAIN DESCRIPTION - ORIENTATION: ORIENTATION: RIGHT

## 2021-02-07 ASSESSMENT — PAIN DESCRIPTION - LOCATION: LOCATION: EAR

## 2021-02-07 ASSESSMENT — PAIN DESCRIPTION - PAIN TYPE: TYPE: ACUTE PAIN

## 2021-02-07 NOTE — ED PROVIDER NOTES
[address] [facility] Emergency Department      Pt Name: Priscilla Ramos  MRN: [id number]  [place] 1990  Date of evaluation: 2/7/2021  Provider: Berna Contreras MD  CHIEF COMPLAINT  Chief Complaint   Patient presents with    Ear Injury     right ear and nose bleeding      HPI  Priscilla Ramos is a 27 y.o. male who presents because of left ear bleeding. He was trying to clean his ears out earlier in the day with a Q-tip and accidentally injured his ear. He had bleeding from his right ear and has had difficulty stopping the bleeding. He was alarmed because he saw some clots on the tissue he has been putting in his ear. He denies any hearing change. He also noticed some bleeding from both of his nostrils. He has never had bleeding problems before. He has not on any blood thinners. He does have chronic kidney disease and had admission to the hospital several weeks ago. I reviewed his diagnostic tests during his admission and his platelets were noted to be mildly low at one thirty-eight. REVIEW OF SYSTEMS:  No fever, no headache, no sob, no bleeding elsewhere, no throat pain or blood in his throat See HPI for further details. Remainder of pertinent ROS reviewed and negative. Nursing notes reviewed. PAST MEDICAL HISTORY  Past Medical History:   Diagnosis Date    Chronic kidney disease     Hematuria     Hepatitis C     Hypertension     Kidney failure     Overdose     Polycystic kidney      SURGICAL HISTORY  History reviewed. No pertinent surgical history. MEDICATIONS:  No current facility-administered medications on file prior to encounter.       Current Outpatient Medications on File Prior to Encounter   Medication Sig Dispense Refill    ferrous sulfate (IRON 325) 325 (65 Fe) MG tablet Take 325 mg by mouth daily (with breakfast)      pantoprazole (PROTONIX) 40 MG tablet Take 40 mg by mouth daily      Cholecalciferol (VITAMIN D3) 50 MCG (2000 UT) CAPS Take 2,000 Units by mouth daily       sertraline (ZOLOFT) 100 MG tablet Take 100 mg by mouth daily      ondansetron (ZOFRAN-ODT) 4 MG disintegrating tablet Take 4 mg by mouth every 8 hours as needed for Nausea or Vomiting      amLODIPine (NORVASC) 5 MG tablet Take 5 mg by mouth 2 times daily       acetaminophen (TYLENOL) 325 MG tablet Take 650 mg by mouth every 6 hours as needed for Pain       ALLERGIES  Patient has no known allergies. SOCIAL HISTORY:  Social History     Tobacco Use    Smoking status: Current Every Day Smoker     Packs/day: 1.00     Types: Cigarettes    Smokeless tobacco: Never Used   Substance Use Topics    Alcohol use: Not Currently    Drug use: Not Currently     Comment: Hx of Overdose in Care Everywhere     IMMUNIZATIONS:    There is no immunization history on file for this patient. PHYSICAL EXAM  VITAL SIGNS:  Blood pressure (!) 157/86, pulse 66, temperature 98.3 °F (36.8 °C), temperature source Oral, resp. rate 18, height 6' 5\" (1.956 m), weight 190 lb (86.2 kg), SpO2 99 %.   Constitutional:  27 y.o. male who does not appear toxic but does appear pale and sallow  HENT:  Atraumatic, mucous membranes moist, right TM appears to be intact, there is a small amount of fresh blood in the canal and there is some findings of abrasion and uplifted skin at the inferior, 6 o'clock position of the canal mid aspect, externally the ear appears normal, the left auditory canal does have some dried blood inferiorly and intact TM, his nostrils appear to have a normal septum but there is findings of some dried blood near the septum bilaterally  Eyes:   Conjunctiva clear, no icterus  Neck:  Supple, no signs of injury, good ROM  Thorax & Lungs:  Respiratory effort normal  Abdomen:  Non distended  Back:  No deformity  Extremities:  No cyanosis, no edema  Skin:  Warm, dry  Neurologic:  Alert, no slurred speech, gait is normal    DIAGNOSTIC RESULTS:  Labs Reviewed   CBC WITH AUTO DIFFERENTIAL - Abnormal; Notable for the following components:       Result appointment as soon as possible for a visit       FINAL IMPRESSION:    1. Bleeding from ear, unspecified laterality    2. Epistaxis      (Please note that I used voice recognition software to generate this note.   Occasionally words are mistranscribed despite my efforts to edit errors.)        Vik Agustin MD  02/07/21 3704

## 2021-02-09 ENCOUNTER — TELEPHONE (OUTPATIENT)
Dept: CARDIOLOGY CLINIC | Age: 31
End: 2021-02-09

## 2021-02-09 NOTE — TELEPHONE ENCOUNTER
Spoke to pt to R/S today's appt that he cancelled. Pt states he cannot miss work and the only day he can come is Monday 2.15.21. Alicia Nix currently doesn'  t have any appt's for that day. The pt was told to call Monday to see if there's any cancellations.

## 2021-02-17 PROBLEM — N39.0 UTI (URINARY TRACT INFECTION): Status: RESOLVED | Noted: 2021-01-18 | Resolved: 2021-02-17

## 2021-03-08 ENCOUNTER — TELEPHONE (OUTPATIENT)
Dept: CARDIOLOGY CLINIC | Age: 31
End: 2021-03-08

## 2021-03-11 PROCEDURE — 93244 EXT ECG>48HR<7D REV&INTERPJ: CPT | Performed by: INTERNAL MEDICINE

## 2021-03-20 PROCEDURE — 99284 EMERGENCY DEPT VISIT MOD MDM: CPT

## 2021-03-21 ENCOUNTER — HOSPITAL ENCOUNTER (EMERGENCY)
Age: 31
Discharge: HOME | End: 2021-03-21
Attending: EMERGENCY MEDICINE
Payer: COMMERCIAL

## 2021-03-21 ENCOUNTER — APPOINTMENT (OUTPATIENT)
Dept: GENERAL RADIOLOGY | Age: 31
End: 2021-03-21
Payer: COMMERCIAL

## 2021-03-21 ENCOUNTER — APPOINTMENT (OUTPATIENT)
Dept: CT IMAGING | Age: 31
End: 2021-03-21
Payer: COMMERCIAL

## 2021-03-21 VITALS
SYSTOLIC BLOOD PRESSURE: 155 MMHG | RESPIRATION RATE: 18 BRPM | OXYGEN SATURATION: 96 % | BODY MASS INDEX: 23.66 KG/M2 | WEIGHT: 199.5 LBS | TEMPERATURE: 98.4 F | DIASTOLIC BLOOD PRESSURE: 87 MMHG | HEART RATE: 74 BPM

## 2021-03-21 DIAGNOSIS — K12.0 APHTHOUS ULCER: Primary | ICD-10-CM

## 2021-03-21 LAB
BACTERIA: ABNORMAL /HPF
BILIRUBIN URINE: NEGATIVE
BLOOD, URINE: ABNORMAL
CLARITY: CLEAR
COLOR: YELLOW
GLUCOSE URINE: NEGATIVE MG/DL
KETONES, URINE: NEGATIVE MG/DL
LEUKOCYTE ESTERASE, URINE: ABNORMAL
MICROSCOPIC EXAMINATION: YES
NITRITE, URINE: NEGATIVE
PH UA: 6 (ref 5–8)
PROTEIN UA: 100 MG/DL
RBC UA: ABNORMAL /HPF (ref 0–4)
SPECIFIC GRAVITY UA: 1.01 (ref 1–1.03)
URINE REFLEX TO CULTURE: YES
URINE TYPE: ABNORMAL
UROBILINOGEN, URINE: 0.2 E.U./DL
WBC UA: ABNORMAL /HPF (ref 0–5)

## 2021-03-21 PROCEDURE — 70360 X-RAY EXAM OF NECK: CPT

## 2021-03-21 PROCEDURE — 81001 URINALYSIS AUTO W/SCOPE: CPT

## 2021-03-21 PROCEDURE — 74176 CT ABD & PELVIS W/O CONTRAST: CPT

## 2021-03-21 PROCEDURE — 87086 URINE CULTURE/COLONY COUNT: CPT

## 2021-03-21 PROCEDURE — 6370000000 HC RX 637 (ALT 250 FOR IP): Performed by: EMERGENCY MEDICINE

## 2021-03-21 RX ORDER — PREDNISONE 10 MG/1
60 TABLET ORAL DAILY
Qty: 30 TABLET | Refills: 0 | Status: SHIPPED | OUTPATIENT
Start: 2021-03-21 | End: 2021-03-26

## 2021-03-21 RX ORDER — PREDNISONE 20 MG/1
60 TABLET ORAL ONCE
Status: COMPLETED | OUTPATIENT
Start: 2021-03-21 | End: 2021-03-21

## 2021-03-21 RX ADMIN — PREDNISONE 60 MG: 20 TABLET ORAL at 00:59

## 2021-03-21 ASSESSMENT — PAIN SCALES - GENERAL: PAINLEVEL_OUTOF10: 5

## 2021-03-21 ASSESSMENT — PAIN DESCRIPTION - LOCATION: LOCATION: MOUTH

## 2021-03-21 ASSESSMENT — PAIN DESCRIPTION - DESCRIPTORS: DESCRIPTORS: ACHING

## 2021-03-21 ASSESSMENT — PAIN DESCRIPTION - PAIN TYPE: TYPE: ACUTE PAIN

## 2021-03-21 NOTE — ED PROVIDER NOTES
[address]   eMERGENCY dEPARTMENT eNCOUnter      Pt Name: Demian Coates  MRN: [id number]  [place] 1990  Date of evaluation: 3/20/2021  Provider: Hermann Le MD  PCP: Zohreh Hernadez MD      [address] [place] [address]       Chief Complaint   Patient presents with    Oral Swelling    Other     States his kidney stones are making it difficult to urinate.  Dysphagia     Eating candy and drinking milk       HISTORY OFPRESENT ILLNESS   (Location/Symptom, Timing/Onset, Context/Setting, Quality, Duration, Modifying Factors,Severity)  Note limiting factors. Demian Coates is a 27 y.o. male is with multiple complaints stating that his tongue is swollen he has some difficulty swallowing and his lips are swollen he also states that he is having more difficulty urinating states that he has kidney stones he is seen by specialists at [facility] he has a history of polycystic kidney disease and on his last CAT scan which was not long ago he had very enlarged kidneys and kidney stone versus abdominal calcification he denies any vomiting denies any fever or chills denies any difficulty breathing    Nursing Notes were all reviewed and agreed with or any disagreements were addressed  in the HPI. REVIEW OF SYSTEMS    (2-9 systems for level 4, 10 or more for level 5)     Review of Systems    Positives and Pertinent negatives as per HPI. Except as noted above in the ROS, all other systems were reviewed andnegative. PASTMEDICAL HISTORY     Past Medical History:   Diagnosis Date    Chronic kidney disease     Hematuria     Hepatitis C     Hypertension     Kidney failure     Overdose     Polycystic kidney          SURGICAL HISTORY     History reviewed. No pertinent surgical history.       CURRENT MEDICATIONS       Discharge Medication List as of 3/21/2021 12:53 AM      CONTINUE these medications which have NOT CHANGED    Details   ferrous sulfate (IRON 325) 325 (65 Fe) MG tablet Take 325 mg by mouth daily (with breakfast)Historical Med      pantoprazole (PROTONIX) 40 MG tablet Take 40 mg by mouth dailyHistorical Med      Cholecalciferol (VITAMIN D3) 50 MCG (2000 UT) CAPS Take 2,000 Units by mouth daily Historical Med      sertraline (ZOLOFT) 100 MG tablet Take 100 mg by mouth dailyHistorical Med      ondansetron (ZOFRAN-ODT) 4 MG disintegrating tablet Take 4 mg by mouth every 8 hours as needed for Nausea or VomitingHistorical Med      amLODIPine (NORVASC) 5 MG tablet Take 5 mg by mouth 2 times daily Historical Med      acetaminophen (TYLENOL) 325 MG tablet Take 650 mg by mouth every 6 hours as needed for PainHistorical Med             ALLERGIES     Patient has no known allergies. FAMILY HISTORY     History reviewed. No pertinent family history.        SOCIAL HISTORY       Social History     Socioeconomic History    Marital status: Single     Spouse name: None    Number of children: None    Years of education: None    Highest education level: None   Occupational History    None   Social Needs    Financial resource strain: None    Food insecurity     Worry: None     Inability: None    Transportation needs     Medical: None     Non-medical: None   Tobacco Use    Smoking status: Current Every Day Smoker     Packs/day: 1.00     Types: Cigarettes    Smokeless tobacco: Never Used   Substance and Sexual Activity    Alcohol use: Not Currently    Drug use: Not Currently     Comment: Hx of Overdose in Care Everywhere    Sexual activity: Yes     Partners: Female   Lifestyle    Physical activity     Days per week: None     Minutes per session: None    Stress: None   Relationships    Social connections     Talks on phone: None     Gets together: None     Attends Zoroastrianism service: None     Active member of club or organization: None     Attends meetings of clubs or organizations: None     Relationship status: None    Intimate partner violence     Fear of current or ex partner: None     Emotionally abused: None     Physically abused: None     Forced sexual activity: None   Other Topics Concern    None   Social History Narrative    None       SCREENINGS      @FLOW([id number])@      PHYSICAL EXAM    (up to 7 for level 4, 8 or more for level 5)     ED Triage Vitals [03/21/21 0002]   BP Temp Temp Source Pulse Resp SpO2 Height Weight   (!) 155/87 98.4 °F (36.9 °C) Oral 74 18 96 % -- 199 lb 8 oz (90.5 kg)       Physical Exam      General Appearance:  Alert, cooperative, no distress, appears stated age. Head:  Normocephalic, without obviousabnormality, atraumatic. Eyes:  conjunctiva/corneas clear, EOM's intact. Sclera anicteric. ENT: Mucous membranes moist.  Examination of the tongue shows what appears to be small clear blisters there are multiple they are not large enough to be herpetic   Neck: Supple, symmetrical, trachea midline, no adenopathy. No jugular venous distention. Lungs:   Clear to auscultation bilaterally, respirationsunlabored. No rales, rhonchi or no wheezing no stridor patient is eating candy without difficulty   Chest Wall:  No tenderness. Heart:  Regular rate and rhythm, S1 and S2 normal, no murmur, rub or gallop. Abdomen:   Soft, non-tender, bowel sounds active,   no masses, no organomegaly. Extremities: No edema, cords or calf tenderness. Full range of motion. Pulses: 2+ and symmetric   Skin: Turgor is normal, no rashes or lesions. Neurologic: Alert and oriented X 3. No focal findings.   Motor grossly normal.  Speech clear, no drift, CN III-XII grossly intact,        DIAGNOSTIC RESULTS   LABS:    Labs Reviewed   URINE RT REFLEX TO CULTURE - Abnormal; Notable for the following components:       Result Value    Blood, Urine SMALL (*)     Protein,  (*)     Leukocyte Esterase, Urine TRACE (*)     All other components within normal limits    Narrative:     Performed at:  [facility]) [facility]  [address],  [place] [address]   Phone [phone]   MICROSCOPIC URINALYSIS - Abnormal; Notable for the following components:    WBC, UA 10-20 (*)     RBC, UA 21-50 (*)     Bacteria, UA 1+ (*)     All other components within normal limits    Narrative:     Performed at:  [facility]  [place] [address]   Phone [phone]   CULTURE, URINE       All other labs were within normal range or not returned as of this dictation. EKG: All EKG's are interpreted by the Emergency Department Physician who eithersigns or Co-signs this chart in the absence of a cardiologist.        RADIOLOGY:   Non-plain film images such as CT, Ultrasound and MRI are read by the radiologist. Plain radiographic images are visualized by myself. *    Interpretation per the Radiologist below, if available at the time of this note:    CT ABDOMEN PELVIS WO CONTRAST Additional Contrast? None   Final Result     Motion degraded study, as above. XR NECK SOFT TISSUE   Final Result     Hazy opacity overlying the region of the epiglottis, potentially    artifact. Correlate clinically. PROCEDURES   Unless otherwise noted below, none     Procedures    *    CRITICAL CARE TIME   N/A      EMERGENCY DEPARTMENT COURSE and DIFFERENTIALDIAGNOSIS/MDM:   Vitals:    Vitals:    03/21/21 0002   BP: (!) 155/87   Pulse: 74   Resp: 18   Temp: 98.4 °F (36.9 °C)   TempSrc: Oral   SpO2: 96%   Weight: 199 lb 8 oz (90.5 kg)       Patient was given thefollowing medications:  Medications   predniSONE (DELTASONE) tablet 60 mg (60 mg Oral Given 3/21/21 0059)           The patient tolerated their visit well. The patient and / or the familywere informed of the results of any tests, a time was given to answer questions. FINAL IMPRESSION      1.  Aphthous ulcer          DISPOSITION/PLAN   DISPOSITION Decision To Discharge 03/21/2021 12:49:09 AM      PATIENT REFERRED TO:  Sherry Ha MD  [facility] [address]  [phone]    In 2 days        DISCHARGE MEDICATIONS:  Discharge Medication List as of 3/21/2021 12:53 AM      START taking these medications    Details   predniSONE (DELTASONE) 10 MG tablet Take 6 tablets by mouth daily for 5 doses, Disp-30 tablet, R-0Normal             DISCONTINUED MEDICATIONS:  Discharge Medication List as of 3/21/2021 12:53 AM                 (Please note that portions of this note were completed with a voice recognition program.  Efforts were made to edit the dictations but occasionally words are mis-transcribed.)    Nicky Lopez MD (electronically signed)      Nicky Lopez MD  03/21/21 9144

## 2021-03-22 PROBLEM — N18.4 CKD (CHRONIC KIDNEY DISEASE) STAGE 4, GFR 15-29 ML/MIN (HCC): Status: ACTIVE | Noted: 2021-01-18

## 2021-03-22 PROBLEM — B18.2 HEP C W/O COMA, CHRONIC (HCC): Status: ACTIVE | Noted: 2017-10-16

## 2021-03-22 PROBLEM — F19.10 DRUG ABUSE (HCC): Status: ACTIVE | Noted: 2020-06-14

## 2021-03-22 LAB — URINE CULTURE, ROUTINE: NORMAL

## 2021-03-22 NOTE — PATIENT INSTRUCTIONS
Patient Education        Well Visit, Ages 25 to 48: Care Instructions  Overview     Well visits can help you stay healthy. Your doctor has checked your overall health and may have suggested ways to take good care of yourself. Your doctor also may have recommended tests. At home, you can help prevent illness with healthy eating, regular exercise, and other steps. Follow-up care is a key part of your treatment and safety. Be sure to make and go to all appointments, and call your doctor if you are having problems. It's also a good idea to know your test results and keep a list of the medicines you take. How can you care for yourself at home? · Get screening tests that you and your doctor decide on. Screening helps find diseases before any symptoms appear. · Eat healthy foods. Choose fruits, vegetables, whole grains, protein, and low-fat dairy foods. Limit fat, especially saturated fat. Reduce salt in your diet. · Limit alcohol. If you are a man, have no more than 2 drinks a day or 14 drinks a week. If you are a woman, have no more than 1 drink a day or 7 drinks a week. · Get at least 30 minutes of physical activity on most days of the week. Walking is a good choice. You also may want to do other activities, such as running, swimming, cycling, or playing tennis or team sports. Discuss any changes in your exercise program with your doctor. · Reach and stay at a healthy weight. This will lower your risk for many problems, such as obesity, diabetes, heart disease, and high blood pressure. · Do not smoke or allow others to smoke around you. If you need help quitting, talk to your doctor about stop-smoking programs and medicines. These can increase your chances of quitting for good. · Care for your mental health. It is easy to get weighed down by worry and stress. Learn strategies to manage stress, like deep breathing and mindfulness, and stay connected with your family and community.  If you find you often feel sad or hopeless, talk with your doctor. Treatment can help. · Talk to your doctor about whether you have any risk factors for sexually transmitted infections (STIs). You can help prevent STIs if you wait to have sex with a new partner (or partners) until you've each been tested for STIs. It also helps if you use condoms (male or female condoms) and if you limit your sex partners to one person who only has sex with you. Vaccines are available for some STIs, such as HPV. · Use birth control if it's important to you to prevent pregnancy. Talk with your doctor about the choices available and what might be best for you. · If you think you may have a problem with alcohol or drug use, talk to your doctor. This includes prescription medicines (such as amphetamines and opioids) and illegal drugs (such as cocaine and methamphetamine). Your doctor can help you figure out what type of treatment is best for you. · Protect your skin from too much sun. When you're outdoors from 10 a.m. to 4 p.m., stay in the shade or cover up with clothing and a hat with a wide brim. Wear sunglasses that block UV rays. Even when it's cloudy, put broad-spectrum sunscreen (SPF 30 or higher) on any exposed skin. · See a dentist one or two times a year for checkups and to have your teeth cleaned. · Wear a seat belt in the car. When should you call for help? Watch closely for changes in your health, and be sure to contact your doctor if you have any problems or symptoms that concern you. Where can you learn more? Go to [email or web address] org and sign in to your [employer] account. Enter P072 in the CONSTRVCT box to learn more about \"Well Visit, Ages 25 to 48: Care Instructions. \"     If you do not have an account, please click on the \"Sign Up Now\" link. Current as of: May 27, 2020               Content Version: 12.8  © 9250-8663 Healthwise, Incorporated. Care instructions adapted under license by [facility] ([facility]).  If you have questions about a medical condition or this instruction, always ask your healthcare professional. [place] [address] any warranty or liability for your use of this information.

## 2021-03-23 ENCOUNTER — OFFICE VISIT (OUTPATIENT)
Dept: PRIMARY CARE CLINIC | Age: 31
End: 2021-03-23
Payer: COMMERCIAL

## 2021-03-23 VITALS
SYSTOLIC BLOOD PRESSURE: 144 MMHG | HEIGHT: 76 IN | OXYGEN SATURATION: 98 % | WEIGHT: 195 LBS | HEART RATE: 78 BPM | DIASTOLIC BLOOD PRESSURE: 79 MMHG | BODY MASS INDEX: 23.75 KG/M2 | TEMPERATURE: 97.9 F

## 2021-03-23 DIAGNOSIS — B18.2 HEP C W/O COMA, CHRONIC (HCC): ICD-10-CM

## 2021-03-23 DIAGNOSIS — N52.9 VASCULOGENIC ERECTILE DYSFUNCTION, UNSPECIFIED VASCULOGENIC ERECTILE DYSFUNCTION TYPE: ICD-10-CM

## 2021-03-23 DIAGNOSIS — Z23 NEED FOR PROPHYLACTIC VACCINATION AGAINST STREPTOCOCCUS PNEUMONIAE (PNEUMOCOCCUS): ICD-10-CM

## 2021-03-23 DIAGNOSIS — Z11.4 SCREENING FOR HIV (HUMAN IMMUNODEFICIENCY VIRUS): ICD-10-CM

## 2021-03-23 DIAGNOSIS — Q61.3 POLYCYSTIC KIDNEY: ICD-10-CM

## 2021-03-23 DIAGNOSIS — F33.9 EPISODE OF RECURRENT MAJOR DEPRESSIVE DISORDER, UNSPECIFIED DEPRESSION EPISODE SEVERITY (HCC): ICD-10-CM

## 2021-03-23 DIAGNOSIS — Z76.89 ENCOUNTER TO ESTABLISH CARE WITH NEW DOCTOR: Primary | ICD-10-CM

## 2021-03-23 DIAGNOSIS — Z23 NEED FOR DIPHTHERIA-TETANUS-PERTUSSIS (TDAP) VACCINE: ICD-10-CM

## 2021-03-23 DIAGNOSIS — N18.4 CKD (CHRONIC KIDNEY DISEASE) STAGE 4, GFR 15-29 ML/MIN (HCC): ICD-10-CM

## 2021-03-23 DIAGNOSIS — Z13.1 SCREENING FOR DIABETES MELLITUS: ICD-10-CM

## 2021-03-23 DIAGNOSIS — F19.10 DRUG ABUSE (HCC): ICD-10-CM

## 2021-03-23 DIAGNOSIS — N53.12 PAINFUL EJACULATION: ICD-10-CM

## 2021-03-23 DIAGNOSIS — Z13.220 SCREENING FOR HYPERLIPIDEMIA: ICD-10-CM

## 2021-03-23 PROCEDURE — 99203 OFFICE O/P NEW LOW 30 MIN: CPT | Performed by: STUDENT IN AN ORGANIZED HEALTH CARE EDUCATION/TRAINING PROGRAM

## 2021-03-23 RX ORDER — NEOMYCIN SULFATE, POLYMYXIN B SULFATE, HYDROCORTISONE 3.5; 10000; 1 MG/ML; [USP'U]/ML; MG/ML
SOLUTION/ DROPS AURICULAR (OTIC)
COMMUNITY
Start: 2021-02-07 | End: 2021-04-21

## 2021-03-23 RX ORDER — LISINOPRIL 5 MG/1
5 TABLET ORAL DAILY
COMMUNITY
Start: 2020-08-06

## 2021-03-23 RX ORDER — ONDANSETRON 4 MG/1
TABLET, FILM COATED ORAL
COMMUNITY
Start: 2021-01-18 | End: 2021-04-06

## 2021-03-23 RX ORDER — SILDENAFIL 50 MG/1
50 TABLET, FILM COATED ORAL PRN
Qty: 30 TABLET | Refills: 3 | Status: SHIPPED | OUTPATIENT
Start: 2021-03-23 | End: 2021-04-21

## 2021-03-23 RX ORDER — CALCIUM ACETATE 667 MG/1
667 CAPSULE ORAL
Status: ON HOLD | COMMUNITY
Start: 2021-03-12 | End: 2021-10-07 | Stop reason: HOSPADM

## 2021-03-23 RX ORDER — LOSARTAN POTASSIUM 50 MG/1
TABLET ORAL
COMMUNITY
End: 2021-10-05

## 2021-03-23 RX ORDER — CALCIUM ACETATE 667 MG/1
CAPSULE ORAL
COMMUNITY
Start: 2021-03-15 | End: 2021-04-21

## 2021-03-23 RX ORDER — VITAMIN E 268 MG
400 CAPSULE ORAL NIGHTLY
Status: ON HOLD | COMMUNITY
Start: 2021-03-18 | End: 2021-10-07 | Stop reason: HOSPADM

## 2021-03-23 RX ORDER — AMOXICILLIN 250 MG
2 CAPSULE ORAL DAILY
Qty: 30 TABLET | Refills: 1 | Status: SHIPPED | OUTPATIENT
Start: 2021-03-23 | End: 2021-10-05

## 2021-03-23 RX ORDER — LACTULOSE 10 G/15ML
SOLUTION ORAL
COMMUNITY
Start: 2021-03-11 | End: 2021-10-05

## 2021-03-23 RX ORDER — PROCHLORPERAZINE MALEATE 5 MG/1
TABLET ORAL
COMMUNITY
Start: 2021-02-12 | End: 2021-10-05

## 2021-03-23 ASSESSMENT — ENCOUNTER SYMPTOMS
ABDOMINAL DISTENTION: 0
WHEEZING: 0
BLOOD IN STOOL: 0
CONSTIPATION: 1
SHORTNESS OF BREATH: 0
COUGH: 0

## 2021-03-23 ASSESSMENT — PATIENT HEALTH QUESTIONNAIRE - PHQ9
SUM OF ALL RESPONSES TO PHQ9 QUESTIONS 1 & 2: 0
SUM OF ALL RESPONSES TO PHQ QUESTIONS 1-9: 0
SUM OF ALL RESPONSES TO PHQ QUESTIONS 1-9: 0
1. LITTLE INTEREST OR PLEASURE IN DOING THINGS: 0

## 2021-03-23 NOTE — PROGRESS NOTES
Historical Provider, MD   neomycin-polymyxin-hydrocortisone 1 % SOLN otic solution  Yes Historical Provider, MD   Calcium Acetate, Phos Binder, 667 MG CAPS Take 667 mg by mouth 3 times daily (with meals) Yes Historical Provider, MD   senna-docusate (Austine Demetri) 8.6-50 MG per tablet Take 2 tablets by mouth daily Yes Savanna Downey DO   sildenafil (VIAGRA) 50 MG tablet Take 1 tablet by mouth as needed for Erectile Dysfunction Yes Savanna Downey DO   predniSONE (DELTASONE) 10 MG tablet Take 6 tablets by mouth daily for 5 doses Yes Prisca Leiva MD   ferrous sulfate (IRON 325) 325 (65 Fe) MG tablet Take 325 mg by mouth daily (with breakfast) Yes Historical Provider, MD   pantoprazole (PROTONIX) 40 MG tablet Take 40 mg by mouth daily Yes Historical Provider, MD   Cholecalciferol (VITAMIN D3) 50 MCG (2000 UT) CAPS Take 2,000 Units by mouth daily  Yes Historical Provider, MD   sertraline (ZOLOFT) 100 MG tablet Take 100 mg by mouth daily Yes Historical Provider, MD   ondansetron (ZOFRAN-ODT) 4 MG disintegrating tablet Take 4 mg by mouth every 8 hours as needed for Nausea or Vomiting Yes Historical Provider, MD   amLODIPine (NORVASC) 5 MG tablet Take 5 mg by mouth 2 times daily  Yes Historical Provider, MD   acetaminophen (TYLENOL) 325 MG tablet Take 650 mg by mouth every 6 hours as needed for Pain Yes Historical Provider, MD   Calcium Acetate, Phos Binder, 667 MG CAPS   Historical Provider, MD   lactulose (CHRONULAC) 10 GM/15ML solution   Historical Provider, MD   losartan (COZAAR) 50 MG tablet losartan 50 mg tablet   Take 1 tablet every day by oral route. Historical Provider, MD   ondansetron (ZOFRAN) 4 MG tablet   Historical Provider, MD        No Known Allergies    Past Medical History:   Diagnosis Date    Chronic kidney disease     Hematuria     Hepatitis C     Hypertension     Kidney failure     Overdose     Polycystic kidney        History reviewed. No pertinent surgical history.     Social History Socioeconomic History    Marital status: Single     Spouse name: Not on file    Number of children: Not on file    Years of education: Not on file    Highest education level: Not on file   Occupational History    Not on file   Social Needs    Financial resource strain: Not on file    Food insecurity     Worry: Not on file     Inability: Not on file    Transportation needs     Medical: Not on file     Non-medical: Not on file   Tobacco Use    Smoking status: Current Every Day Smoker     Packs/day: 1.00     Years: 15.00     Pack years: 15.00     Types: Cigarettes    Smokeless tobacco: Never Used   Substance and Sexual Activity    Alcohol use: Not Currently    Drug use: Not Currently     Comment: Hx of Overdose in [address] Sexual activity: Yes     Partners: Female   Lifestyle    Physical activity     Days per week: Not on file     Minutes per session: Not on file    Stress: Not on file   Relationships    Social connections     Talks on phone: Not on file     Gets together: Not on file     Attends Yazidism service: Not on file     Active member of club or organization: Not on file     Attends meetings of clubs or organizations: Not on file     Relationship status: Not on file    Intimate partner violence     Fear of current or ex partner: Not on file     Emotionally abused: Not on file     Physically abused: Not on file     Forced sexual activity: Not on file   Other Topics Concern    Not on file   Social History Narrative    Not on file        History reviewed. No pertinent family history. Vitals:    03/23/21 1306   BP: (!) 154/79   Pulse: 78   Temp: 97.9 °F (36.6 °C)   TempSrc: Temporal   SpO2: 98%   Weight: 195 lb (88.5 kg)   Height: 6' 3.5\" (1.918 m)     Estimated body mass index is 24.05 kg/m² as calculated from the following:    Height as of this encounter: 6' 3.5\" (1.918 m). Weight as of this encounter: 195 lb (88.5 kg). Physical Exam  Vitals signs reviewed. sexual function spontaneous intercourse. Patient's description of relationship with partner healthy. Previous treatment of ED includes none. ASSESSMENT/PLAN:  Liberty Cortez was seen today for established new doctor, other and other. Diagnoses and all orders for this visit:    Encounter to establish care with new doctor: Vitals reviewed and blood pressure mildly elevated. BMI is within normal limits. Reviewed diet exercise regimen patient recommend appropriate lifestyle modifications.  -     TSH with Reflex; Future    Hep C w/o coma, chronic ([facility]): Updating genotype and quant. Current drug user will need to be 6 months sober prior to referral to gastroenterology. Has not injected recently. -     Hepatitis C Genotype; Future  -     Hepatitis C RNA, quantitative, PCR; Future    CKD (chronic kidney disease) stage 4, GFR 15-29 ml/min ([facility]): Follows with Dr. Linsey Stevenson from nephrology. Plan is to place a fistula and start dialysis. -     CBC; Future  -     RENAL FUNCTION PANEL; Future    Polycystic kidney: As above  -     RENAL FUNCTION PANEL; Future    Drug abuse [facility]): Current drug user. Currently drug of choice is heroin, which he snorts. Episode of recurrent major depressive disorder, unspecified depression episode severity ([facility]): Zoloft 100 mg. Still having depressive depressive symptoms. Will readdress at follow-up in 1 month. Vasculogenic erectile dysfunction, unspecified vasculogenic erectile dysfunction type: Suspect related to his chronic disease state. He does not have any red flag symptoms that would contraindicate Viagra. -     sildenafil (VIAGRA) 50 MG tablet; Take 1 tablet by mouth as needed for Erectile Dysfunction    Painful ejaculation: Is difficult for me to determine whether he is talking about urination or ejaculation. He does talk sometimes about urinating, but other times with sex. Will check UA and screen for STDs. -     RPR Reflex;  Future  -     C.trachomatis N.gonorrhoeae DNA, Urine; Future  -     URINALYSIS    Screening for hyperlipidemia  -     Lipid, Fasting; Future    Screening for diabetes mellitus  -     Hemoglobin A1C; Future    Screening for HIV (human immunodeficiency virus)  -     HIV Screen; Future    Need for prophylactic vaccination against Streptococcus pneumoniae (pneumococcus): Needs to be addressed at follow-up visit    Need for diphtheria-tetanus-pertussis (Tdap) vaccine: Needs to be addressed at follow-up visit    Need for hepatitis vaccine: Needs to be vaccinated against both hepatitis A and B. Return in about 4 weeks (around 4/20/2021). An  electronic signature was used to authenticate this note.     --Edna Thakur DO on 3/23/2021 at 2:45 PM

## 2021-03-24 DIAGNOSIS — R55 SYNCOPE AND COLLAPSE: ICD-10-CM

## 2021-04-01 ENCOUNTER — TELEPHONE (OUTPATIENT)
Dept: PRIMARY CARE CLINIC | Age: 31
End: 2021-04-01

## 2021-04-01 NOTE — TELEPHONE ENCOUNTER
Patient is requesting a Prior Authorization   For his Viagra. Patient called and said that [facility] told him that thru [facility] that he needs a PA for Viagra. He said [facility] is another insurance of his.

## 2021-04-06 ENCOUNTER — HOSPITAL ENCOUNTER (EMERGENCY)
Age: 31
Discharge: HOME | End: 2021-04-06
Attending: EMERGENCY MEDICINE
Payer: COMMERCIAL

## 2021-04-06 VITALS
SYSTOLIC BLOOD PRESSURE: 146 MMHG | WEIGHT: 194.5 LBS | OXYGEN SATURATION: 99 % | RESPIRATION RATE: 16 BRPM | HEIGHT: 77 IN | TEMPERATURE: 98 F | DIASTOLIC BLOOD PRESSURE: 69 MMHG | HEART RATE: 85 BPM | BODY MASS INDEX: 22.96 KG/M2

## 2021-04-06 DIAGNOSIS — G89.18 POST-OP PAIN: Primary | ICD-10-CM

## 2021-04-06 DIAGNOSIS — R11.0 NAUSEA: ICD-10-CM

## 2021-04-06 DIAGNOSIS — N18.9 CHRONIC KIDNEY DISEASE, UNSPECIFIED CKD STAGE: ICD-10-CM

## 2021-04-06 LAB
ANION GAP SERPL CALCULATED.3IONS-SCNC: 11 MMOL/L (ref 3–16)
BUN BLDV-MCNC: 81 MG/DL (ref 7–20)
CALCIUM SERPL-MCNC: 8.9 MG/DL (ref 8.3–10.6)
CHLORIDE BLD-SCNC: 104 MMOL/L (ref 99–110)
CO2: 24 MMOL/L (ref 21–32)
CREAT SERPL-MCNC: 5.6 MG/DL (ref 0.9–1.3)
GFR AFRICAN AMERICAN: 14
GFR NON-AFRICAN AMERICAN: 12
GLUCOSE BLD-MCNC: 103 MG/DL (ref 70–99)
POTASSIUM REFLEX MAGNESIUM: 4.6 MMOL/L (ref 3.5–5.1)
SODIUM BLD-SCNC: 139 MMOL/L (ref 136–145)

## 2021-04-06 PROCEDURE — 99283 EMERGENCY DEPT VISIT LOW MDM: CPT

## 2021-04-06 PROCEDURE — 80048 BASIC METABOLIC PNL TOTAL CA: CPT

## 2021-04-06 RX ORDER — CEPHALEXIN 250 MG/1
250 CAPSULE ORAL DAILY
Qty: 5 CAPSULE | Refills: 0 | Status: SHIPPED | OUTPATIENT
Start: 2021-04-06 | End: 2021-04-11

## 2021-04-06 RX ORDER — CEPHALEXIN 500 MG/1
500 CAPSULE ORAL 4 TIMES DAILY
Qty: 28 CAPSULE | Refills: 0 | Status: SHIPPED | OUTPATIENT
Start: 2021-04-06 | End: 2021-04-06 | Stop reason: SDUPTHER

## 2021-04-06 RX ORDER — ONDANSETRON 4 MG/1
4 TABLET, ORALLY DISINTEGRATING ORAL EVERY 8 HOURS PRN
Qty: 20 TABLET | Refills: 0 | Status: ON HOLD | OUTPATIENT
Start: 2021-04-06 | End: 2021-10-07 | Stop reason: HOSPADM

## 2021-04-06 ASSESSMENT — PAIN DESCRIPTION - ORIENTATION: ORIENTATION: LEFT

## 2021-04-06 ASSESSMENT — ENCOUNTER SYMPTOMS: SHORTNESS OF BREATH: 0

## 2021-04-06 ASSESSMENT — PAIN DESCRIPTION - FREQUENCY: FREQUENCY: CONTINUOUS

## 2021-04-06 ASSESSMENT — PAIN SCALES - GENERAL: PAINLEVEL_OUTOF10: 7

## 2021-04-06 NOTE — ED PROVIDER NOTES
[address]      Pt Name: Michelle Vargas  MRN: [id number]  [place] 1990  Date of evaluation: 4/6/2021  Provider: Monse Xiong MD    CHIEF COMPLAINT       Chief Complaint   Patient presents with    Other     rt arm pain/ swelling after av graft 2 days  ago         HISTORY OF PRESENT ILLNESS   (Location/Symptom, Timing/Onset,Context/Setting, Quality, Duration, Modifying Factors, Severity)  Note limiting factors. Michelle Vargas is a 27 y.o. male who presents to the emergency department for left forearm pain and swelling. Patient states he had an AV graft placed 2 days ago. He requires dialysis due to complications of polycystic disease. Patient states that he is also been having nausea since his surgery but he has not vomited. He has not had a fever. His wife is concerned that he might have an infection in the arm. He was given a dose of antibiotics in surgery and none was given otherwise. Patient has not contacted his surgeon. Patient would also like me to check his right ear because he believes that he ruptured his eardrum. Patient initially states about was a week ago however the patient's chart was reviewed and he did have a visit back in February with a similar complaint. Nursing notes were reviewed. REVIEW OF SYSTEMS    (2-9 systems for level 4, 10 or more for level 5)     Review of Systems   Constitutional: Negative for chills, fatigue and fever. Respiratory: Negative for shortness of breath. Cardiovascular: Negative for chest pain. Neurological: Negative for seizures and syncope. PAST MEDICAL HISTORY     Past Medical History:   Diagnosis Date    Chronic kidney disease     Hematuria     Hepatitis C     Hypertension     Kidney failure     Overdose     Polycystic kidney          SURGICALHISTORY     History reviewed. No pertinent surgical history.       CURRENT MEDICATIONS       Discharge Medication List as of 4/6/2021  2:05 AM      CONTINUE these medications which have NOT CHANGED    Details   !! Calcium Acetate, Phos Binder, 667 MG CAPS Historical Med      hydrocortisone 2.5 % cream Place rectally, Rectal, Starting Mon 9/28/2020, Historical Med      lactulose (CHRONULAC) 10 GM/15ML solution Historical Med      lisinopril (PRINIVIL;ZESTRIL) 5 MG tablet Historical Med      losartan (COZAAR) 50 MG tablet losartan 50 mg tablet   Take 1 tablet every day by oral route. Historical Med      prochlorperazine (COMPAZINE) 5 MG tablet Historical Med      vitamin E 400 UNIT capsule Take 400 Units by mouth nightlyHistorical Med      neomycin-polymyxin-hydrocortisone 1 % SOLN otic solution Historical Med      !! Calcium Acetate, Phos Binder, 667 MG CAPS Take 667 mg by mouth 3 times daily (with meals)Historical Med      senna-docusate (PERICOLACE) 8.6-50 MG per tablet Take 2 tablets by mouth daily, Disp-30 tablet, R-1Normal      sildenafil (VIAGRA) 50 MG tablet Take 1 tablet by mouth as needed for Erectile Dysfunction, Disp-30 tablet, R-3Normal      ferrous sulfate (IRON 325) 325 (65 Fe) MG tablet Take 325 mg by mouth daily (with breakfast)Historical Med      pantoprazole (PROTONIX) 40 MG tablet Take 40 mg by mouth dailyHistorical Med      Cholecalciferol (VITAMIN D3) 50 MCG (2000 UT) CAPS Take 2,000 Units by mouth daily Historical Med      sertraline (ZOLOFT) 100 MG tablet Take 100 mg by mouth dailyHistorical Med      amLODIPine (NORVASC) 5 MG tablet Take 5 mg by mouth 2 times daily Historical Med      acetaminophen (TYLENOL) 325 MG tablet Take 650 mg by mouth every 6 hours as needed for PainHistorical Med       !! - Potential duplicate medications found. Please discuss with provider. ALLERGIES     Patient has no known allergies. FAMILY HISTORY     History reviewed. No pertinent family history.        SOCIAL HISTORY       Social History     Socioeconomic History    Marital status: Single     Spouse name: None    Number of children: None    Years of education: None    Highest education level: None   Occupational History    None   Social Needs    Financial resource strain: None    Food insecurity     Worry: None     Inability: None    Transportation needs     Medical: None     Non-medical: None   Tobacco Use    Smoking status: Current Every Day Smoker     Packs/day: 1.00     Years: 15.00     Pack years: 15.00     Types: Cigarettes    Smokeless tobacco: Never Used   Substance and Sexual Activity    Alcohol use: Not Currently    Drug use: Not Currently     Comment: Hx of Overdose in Care Everywhere    Sexual activity: Yes     Partners: Female   Lifestyle    Physical activity     Days per week: None     Minutes per session: None    Stress: None   Relationships    Social connections     Talks on phone: None     Gets together: None     Attends [employer] service: None     Active member of club or organization: None     Attends meetings of clubs or organizations: None     Relationship status: None    Intimate partner violence     Fear of current or ex partner: None     Emotionally abused: None     Physically abused: None     Forced sexual activity: None   Other Topics Concern    None   Social History Narrative    None       SCREENINGS             PHYSICAL EXAM    (up to 7 for level 4, 8 or more for level 5)     ED Triage Vitals [04/06/21 0046]   BP Temp Temp Source Pulse Resp SpO2 Height Weight   (!) 146/69 98 °F (36.7 °C) Oral 85 16 99 % 6' 5\" (1.956 m) 194 lb 8 oz (88.2 kg)       Physical Exam  Vitals signs and nursing note reviewed. Constitutional:       Appearance: Normal appearance. He is well-developed. He is not ill-appearing. HENT:      Head: Normocephalic and atraumatic. Right Ear: Tympanic membrane and external ear normal.      Left Ear: External ear normal.      Ears:      Comments: There appears to be dried wax perhaps mixed with blood at the floor of the ear canal.  No active bleeding.   Eardrum is intact     Nose: Nose normal.   Eyes:      General: No scleral icterus. Right eye: No discharge. Left eye: No discharge. Conjunctiva/sclera: Conjunctivae normal.   Neck:      Musculoskeletal: Neck supple. Cardiovascular:      Rate and Rhythm: Normal rate. Pulmonary:      Effort: Pulmonary effort is normal. No respiratory distress. Musculoskeletal:      Comments: The left upper extremity shows signs of recent surgery. There are several stitches in place. No wound dehiscence no drainage there appears to be appropriate healing. Mild erythema and warmth. Area is tender and edematous. Palpable thrill and bruit is auscultated. Skin:     Coloration: Skin is not pale. Neurological:      Mental Status: He is alert.    Psychiatric:         Mood and Affect: Mood normal.         Behavior: Behavior normal.           DIAGNOSTIC RESULTS     EKG: All EKG's are interpreted by the Emergency Department Physician who either signs or Co-signs this chart in the absence of a cardiologist.    12 lead EKG shows     RADIOLOGY:   Non-plain film images such as CT, Ultrasound and MRI are read by the radiologist. Plain radiographic images are visualized and preliminarily interpreted by the emergency physician with the below findings:        Interpretation per the Radiologist below, if available at the time of this note:    No orders to display         ED BEDSIDE ULTRASOUND:   Performed by ED Physician - none    LABS:  Labs Reviewed   BASIC METABOLIC PANEL W/ REFLEX TO MG FOR LOW K - Abnormal; Notable for the following components:       Result Value    Glucose 103 (*)     BUN 81 (*)     CREATININE 5.6 (*)     GFR Non- 12 (*)     GFR  14 (*)     All other components within normal limits    Narrative:     Sommer Lazar tel. [id number],  Chemistry results called to and read back by Ruperto Mccartney RN, 04/06/2021  02:04, by [facility]  Performed at:  [facility] [address] [address]   Phone [phone]       All other labs were within normal range or not returned as of this dictation. EMERGENCY DEPARTMENT COURSE and DIFFERENTIAL DIAGNOSIS/MDM:   Vitals:    Vitals:    04/06/21 0046   BP: (!) 146/69   Pulse: 85   Resp: 16   Temp: 98 °F (36.7 °C)   TempSrc: Oral   SpO2: 99%   Weight: 194 lb 8 oz (88.2 kg)   Height: 6' 5\" (1.956 m)     I explained to the patient and his wife that most likely his symptoms are postsurgical changes to the area. However he will be given a course of antibiotics for possible infection. Patient will be given Zofran for his nausea. A BMP is ordered to evaluate for the patient's potassium in the setting of possible worsening kidney function. Potassium is normal he does have increasing uremia. The patient does not have any serious or life-threatening symptoms of uremia I encouraged that he follows up with his nephrologist in the morning. Patient states plans are being made for them to start his dialysis sooner than initially scheduled. CRITICAL CARE TIME   None       CONSULTS:  None    PROCEDURES:       Procedures    FINAL IMPRESSION      1. Post-op pain    2. Nausea    3. Chronic kidney disease, unspecified CKD stage          DISPOSITION/PLAN   DISPOSITION Decision To Discharge 04/06/2021 02:04:50 AM      PATIENT REFERREDTO:    Please contact your vascular surgeon in the morning using the contact information provided after your surgery.         Rober Christina DO  [address] [place] [address]  [phone]      As needed for any other complaint      Please follow up with your nephrologist in 1 day          DISCHARGEMEDICATIONS:  Discharge Medication List as of 4/6/2021  2:05 AM      START taking these medications    Details   cephALEXin (KEFLEX) 500 MG capsule Take 1 capsule by mouth 4 times daily for 7 days, Disp-28 capsule, R-0Normal                (Please note that

## 2021-04-06 NOTE — ED NOTES
Pt dc/d with instructions in stable condition, ambulatory to lobby. Home per ride.       Mary Gustafson RN  04/06/21 0730

## 2021-04-07 PROBLEM — F33.1 MODERATE EPISODE OF RECURRENT MAJOR DEPRESSIVE DISORDER (HCC): Status: ACTIVE | Noted: 2021-04-07

## 2021-04-07 PROBLEM — N18.6 ESRD (END STAGE RENAL DISEASE) ON DIALYSIS (HCC): Status: ACTIVE | Noted: 2021-01-18

## 2021-04-07 PROBLEM — I77.2: Status: ACTIVE | Noted: 2021-04-07

## 2021-04-07 PROBLEM — Z99.2 ESRD (END STAGE RENAL DISEASE) ON DIALYSIS (HCC): Status: ACTIVE | Noted: 2021-01-18

## 2021-04-13 ENCOUNTER — OFFICE VISIT (OUTPATIENT)
Dept: CARDIOLOGY CLINIC | Age: 31
End: 2021-04-13
Payer: COMMERCIAL

## 2021-04-13 VITALS
SYSTOLIC BLOOD PRESSURE: 124 MMHG | BODY MASS INDEX: 23.16 KG/M2 | WEIGHT: 196.2 LBS | HEART RATE: 77 BPM | HEIGHT: 77 IN | DIASTOLIC BLOOD PRESSURE: 62 MMHG

## 2021-04-13 DIAGNOSIS — R55 SYNCOPE AND COLLAPSE: Primary | ICD-10-CM

## 2021-04-13 DIAGNOSIS — I51.7 ENLARGED LA (LEFT ATRIUM): ICD-10-CM

## 2021-04-13 PROCEDURE — 4004F PT TOBACCO SCREEN RCVD TLK: CPT | Performed by: NURSE PRACTITIONER

## 2021-04-13 PROCEDURE — G8420 CALC BMI NORM PARAMETERS: HCPCS | Performed by: NURSE PRACTITIONER

## 2021-04-13 PROCEDURE — 93000 ELECTROCARDIOGRAM COMPLETE: CPT | Performed by: NURSE PRACTITIONER

## 2021-04-13 PROCEDURE — 99204 OFFICE O/P NEW MOD 45 MIN: CPT | Performed by: NURSE PRACTITIONER

## 2021-04-13 PROCEDURE — G8427 DOCREV CUR MEDS BY ELIG CLIN: HCPCS | Performed by: NURSE PRACTITIONER

## 2021-04-13 RX ORDER — DIAZEPAM 5 MG/1
TABLET ORAL
COMMUNITY
Start: 2021-04-09 | End: 2021-04-21

## 2021-04-13 RX ORDER — AMOXICILLIN 500 MG/1
CAPSULE ORAL
COMMUNITY
Start: 2021-04-09 | End: 2021-04-21

## 2021-04-13 RX ORDER — CHOLECALCIFEROL (VITAMIN D3) 125 MCG
CAPSULE ORAL
COMMUNITY
Start: 2021-03-12 | End: 2021-04-13 | Stop reason: CLARIF

## 2021-04-13 RX ORDER — TRAMADOL HYDROCHLORIDE 50 MG/1
TABLET ORAL
COMMUNITY
Start: 2021-04-02 | End: 2021-04-13 | Stop reason: CLARIF

## 2021-04-13 NOTE — PROGRESS NOTES
[facility]   Electrophysiology  Office Visit   New Patient  Date: 4/13/2021    Chief Complaint   Patient presents with    Loss of Consciousness    Hypertension       Cardiac HX: Sonny Milton is a 27 y.o. man with a h/o HTN, Hep C, polycystic kidney disease, CKD who originally presented in January 2021 with a syncopal event, noted to be in JALEN, s/p R arm AV graft. Interval History/HPI: Patient is here as a new patient after he had a syncopal event in January 2021. Patient states that he had been sitting watching TV, had gotten up and passed out. According to his  who was in the office with him today, she states that he probably was dehydrated that day. They both work as movers, had been moving furniture and not had anything to eat or drink. Patient admits to getting dizzy with going from sitting to standing. He has not had any prior syncopal events. He does not feel any heart racing, palpitations or irregular heartbeats. He did wear a 2-week Zio patch that showed no arrhythmias and only rare PACs and PVCs. His echo in the hospital showed a normal EF and moderate left atrial enlargement. He does admit to snoring. No atrial fibrillation was seen on his outpatient monitor. He did recently have an AV fistula placed in his left arm for upcoming dialysis. He denies any chest pain, shortness of breath, PND, orthopnea or lower extremity edema. He states his blood pressure has been high in the past and not always well controlled.     Home medications:   Current Outpatient Medications on File Prior to Visit   Medication Sig Dispense Refill    ondansetron (ZOFRAN ODT) 4 MG disintegrating tablet Take 1 tablet by mouth every 8 hours as needed for Nausea 20 tablet 0    Calcium Acetate, Phos Binder, 667 MG CAPS       hydrocortisone 2.5 % cream Place rectally      lactulose (CHRONULAC) 10 GM/15ML solution       lisinopril (PRINIVIL;ZESTRIL) 5 MG tablet       losartan (COZAAR) 50 MG tablet losartan 50 mg tablet   Take 1 tablet every day by oral route.  prochlorperazine (COMPAZINE) 5 MG tablet       vitamin E 400 UNIT capsule Take 400 Units by mouth nightly      neomycin-polymyxin-hydrocortisone 1 % SOLN otic solution       Calcium Acetate, Phos Binder, 667 MG CAPS Take 667 mg by mouth 3 times daily (with meals)      senna-docusate (PERICOLACE) 8.6-50 MG per tablet Take 2 tablets by mouth daily 30 tablet 1    sildenafil (VIAGRA) 50 MG tablet Take 1 tablet by mouth as needed for Erectile Dysfunction 30 tablet 3    ferrous sulfate (IRON 325) 325 (65 Fe) MG tablet Take 325 mg by mouth daily (with breakfast)      pantoprazole (PROTONIX) 40 MG tablet Take 40 mg by mouth daily      Cholecalciferol (VITAMIN D3) 50 MCG (2000 UT) CAPS Take 2,000 Units by mouth daily       sertraline (ZOLOFT) 100 MG tablet Take 100 mg by mouth daily      amLODIPine (NORVASC) 5 MG tablet Take 5 mg by mouth 2 times daily       acetaminophen (TYLENOL) 325 MG tablet Take 650 mg by mouth every 6 hours as needed for Pain      amoxicillin (AMOXIL) 500 MG capsule       diazePAM (VALIUM) 5 MG tablet        No current facility-administered medications on file prior to visit. Past Medical History:   Diagnosis Date    Chronic kidney disease     Hematuria     Hepatitis C     Hypertension     Kidney failure     Overdose     Polycystic kidney         No past surgical history on file. No Known Allergies    Social History:  Reviewed. reports that he has been smoking cigarettes. He has a 15.00 pack-year smoking history. He has never used smokeless tobacco. He reports previous alcohol use. He reports previous drug use. Family History:  Reviewed. family history is not on file. Review of System:    · Constitutional: No fevers, chills. · Eyes: No visual changes or diplopia. No scleral icterus. · ENT: No Headaches. No mouth sores or sore throat.   · Cardiovascular: No for chest pain, No for dyspnea on exertion, Yes for palpitations or Yes for loss of consciousness. No cough, hemoptysis, No for pleuritic pain, or phlebitis. · Respiratory: No for cough or wheezing. No hematemesis. · Gastrointestinal: No abdominal pain, blood in stools. · Genitourinary: No dysuria, or hematuria. · Musculoskeletal: No gait disturbance,    · Integumentary: No rash or pruritis. · Neurological: No headache, change in muscle strength, numbness or tingling. · Psychiatric: No anxiety, or depression. · Endocrine: No temperature intolerance. No excessive thirst, fluid intake, or urination. · Hem/Lymph: No abnormal bruising or bleeding, blood clots or swollen lymph nodes. · Allergic/Immunologic: No nasal congestion or hives. Physical Examination:  Vitals:    04/13/21 1615   BP: 124/62   Pulse: 77         Wt Readings from Last 3 Encounters:   04/13/21 196 lb 3.2 oz (89 kg)   04/06/21 194 lb 8 oz (88.2 kg)   03/23/21 195 lb (88.5 kg)       · Constitutional: Oriented. No distress. · Head: Normocephalic and atraumatic. · Mouth/Throat: Oropharynx is clear and moist.   · Eyes: Conjunctivae clear without jaunduice. PERRL. · Neck: Neck supple. No rigidity. No JVD present. · Cardiovascular: Normal rate, regular rhythm, S1&S2. · Pulmonary/Chest: Bilateral respiratory sounds. No wheezes, No rhonchi. · Abdominal: Soft. Bowel sounds present. No distension, No tenderness. · Musculoskeletal: No tenderness. No edema    · Lymphadenopathy: Has no cervical adenopathy. · Neurological: Alert and oriented. Cranial nerve appears intact, No Gross deficit   · Skin: Skin is warm and dry. No rash noted. · Psychiatric: Has a normal mood, affect and behavior     Labs:  Reviewed. No results for input(s): NA, K, CL, CO2, PHOS, BUN, CREATININE in the last 72 hours. Invalid input(s): CA,  TSH  No results for input(s): WBC, HGB, HCT, MCV, PLT in the last 72 hours.   No results found for: CKTOTAL, CKMB, CKMBINDEX, TROPONINI  No results found for: BNP  No results found for: PROTIME, INR  No results found for: CHOL, HDL, TRIG    ECG: Personally reviewed: NSR, HR 77, , QRS 96, QTc 411    ECHO: 1/18/2021  Summary   Normal left ventricle size, wall thickness, and systolic function with an   estimated ejection fraction of 55-60%. No regional wall motion abnormalities   are seen. Normal diastolic filling pattern. The left atrium is moderately dilated. IVC size is normal (<2.1 cm) but collapses < 50% with respiration consistent   with elevated RA pressure (8 mmHg). Polycystic kidney noted in subcostal   image of liver, noted on previous scan    Stress Test: n/a    Cardiac Angiography: n/a    Problem List:   Patient Active Problem List    Diagnosis Date Noted    Fistula of artery ([facility] 04/07/2021    Moderate episode of recurrent major depressive disorder ([facility] 04/07/2021    ESRD (end stage renal disease) on dialysis ([facility] 01/18/2021    Polycystic kidney 01/18/2021    [facility] 06/14/2020    Hep C w/o coma, chronic ([facility] 10/16/2017        Assessment:   1. Syncope and collapse    2. Enlarged LA (left atrium)    3. ESRD    Cardiac HX: Joselyn Foy is a 27 y.o. man with a h/o HTN, Hep C, polycystic kidney disease, CKD who originally presented in January 2021 with a syncopal event, noted to be in JALEN, s/p R arm AV graft. Syncope  - Appears to be vagal   - Increase water intake if OK with nephrology  - 2-week Zio patch showed a min min HR 41, average 62, max 136, NSR with rare PACs and PVCs. - Echo shows normal EF and enlarged LA  - Will do a sleep study  - Repeat monitor in 4-5 months ( LAE)  - ECG ordered and results personally reviewed     HTN  - BP controlled in the office today- On amlodipine 5 mg daily, losartan 50 mg daily,    EF of 71-14%  No systolic HF  No known CAD  No known AF   No Tobacco use. All questions and concerns were addressed to the patient/family. Alternatives to my treatment were discussed.  The note was completed using EMR. Every effort was made to ensure accuracy; however, inadvertent computerized transcription errors may be present. Patient received education regarding their diagnosis, treatment and medications while in the office today.       Delfina Avalos [address] [facility]

## 2021-04-20 DIAGNOSIS — Q61.3 POLYCYSTIC KIDNEY: ICD-10-CM

## 2021-04-20 DIAGNOSIS — B18.2 HEP C W/O COMA, CHRONIC (HCC): ICD-10-CM

## 2021-04-20 DIAGNOSIS — Z76.89 ENCOUNTER TO ESTABLISH CARE WITH NEW DOCTOR: ICD-10-CM

## 2021-04-20 DIAGNOSIS — Z13.220 SCREENING FOR HYPERLIPIDEMIA: ICD-10-CM

## 2021-04-20 DIAGNOSIS — N18.4 CKD (CHRONIC KIDNEY DISEASE) STAGE 4, GFR 15-29 ML/MIN (HCC): ICD-10-CM

## 2021-04-20 DIAGNOSIS — Z13.1 SCREENING FOR DIABETES MELLITUS: ICD-10-CM

## 2021-04-20 DIAGNOSIS — N53.12 PAINFUL EJACULATION: ICD-10-CM

## 2021-04-20 DIAGNOSIS — Z11.4 SCREENING FOR HIV (HUMAN IMMUNODEFICIENCY VIRUS): ICD-10-CM

## 2021-04-20 LAB
ALBUMIN SERPL-MCNC: 3.7 G/DL (ref 3.4–5)
ANION GAP SERPL CALCULATED.3IONS-SCNC: 9 MMOL/L (ref 3–16)
BUN BLDV-MCNC: 45 MG/DL (ref 7–20)
CALCIUM SERPL-MCNC: 8.3 MG/DL (ref 8.3–10.6)
CHLORIDE BLD-SCNC: 106 MMOL/L (ref 99–110)
CHOLESTEROL, FASTING: 140 MG/DL (ref 0–199)
CO2: 24 MMOL/L (ref 21–32)
CREAT SERPL-MCNC: 5 MG/DL (ref 0.9–1.3)
GFR AFRICAN AMERICAN: 16
GFR NON-AFRICAN AMERICAN: 14
GLUCOSE BLD-MCNC: 84 MG/DL (ref 70–99)
HCT VFR BLD CALC: 30.2 % (ref 40.5–52.5)
HDLC SERPL-MCNC: 45 MG/DL (ref 40–60)
HEMOGLOBIN: 9.9 G/DL (ref 13.5–17.5)
LDL CHOLESTEROL CALCULATED: 72 MG/DL
MCH RBC QN AUTO: 29.9 PG (ref 26–34)
MCHC RBC AUTO-ENTMCNC: 33 G/DL (ref 31–36)
MCV RBC AUTO: 90.8 FL (ref 80–100)
PDW BLD-RTO: 13.9 % (ref 12.4–15.4)
PHOSPHORUS: 5.5 MG/DL (ref 2.5–4.9)
PLATELET # BLD: 148 K/UL (ref 135–450)
PMV BLD AUTO: 9.7 FL (ref 5–10.5)
POTASSIUM SERPL-SCNC: 5 MMOL/L (ref 3.5–5.1)
RBC # BLD: 3.32 M/UL (ref 4.2–5.9)
SODIUM BLD-SCNC: 139 MMOL/L (ref 136–145)
TRIGLYCERIDE, FASTING: 117 MG/DL (ref 0–150)
TSH REFLEX: 1.79 UIU/ML (ref 0.27–4.2)
VLDLC SERPL CALC-MCNC: 23 MG/DL
WBC # BLD: 6.2 K/UL (ref 4–11)

## 2021-04-21 ENCOUNTER — OFFICE VISIT (OUTPATIENT)
Dept: PRIMARY CARE CLINIC | Age: 31
End: 2021-04-21
Payer: COMMERCIAL

## 2021-04-21 VITALS
WEIGHT: 199 LBS | SYSTOLIC BLOOD PRESSURE: 133 MMHG | DIASTOLIC BLOOD PRESSURE: 69 MMHG | BODY MASS INDEX: 23.5 KG/M2 | OXYGEN SATURATION: 97 % | HEART RATE: 55 BPM | HEIGHT: 77 IN

## 2021-04-21 DIAGNOSIS — F33.1 MODERATE EPISODE OF RECURRENT MAJOR DEPRESSIVE DISORDER (HCC): Primary | ICD-10-CM

## 2021-04-21 DIAGNOSIS — N18.6 ESRD (END STAGE RENAL DISEASE) ON DIALYSIS (HCC): ICD-10-CM

## 2021-04-21 DIAGNOSIS — K64.5 EXTERNAL HEMORRHOID, THROMBOSED: ICD-10-CM

## 2021-04-21 DIAGNOSIS — Z99.2 ESRD (END STAGE RENAL DISEASE) ON DIALYSIS (HCC): ICD-10-CM

## 2021-04-21 DIAGNOSIS — N52.9 VASCULOGENIC ERECTILE DYSFUNCTION, UNSPECIFIED VASCULOGENIC ERECTILE DYSFUNCTION TYPE: ICD-10-CM

## 2021-04-21 DIAGNOSIS — I77.2: ICD-10-CM

## 2021-04-21 DIAGNOSIS — K42.9 UMBILICAL HERNIA WITHOUT OBSTRUCTION AND WITHOUT GANGRENE: ICD-10-CM

## 2021-04-21 DIAGNOSIS — K21.9 GASTROESOPHAGEAL REFLUX DISEASE WITHOUT ESOPHAGITIS: ICD-10-CM

## 2021-04-21 LAB
C. TRACHOMATIS DNA ,URINE: NEGATIVE
ESTIMATED AVERAGE GLUCOSE: 114 MG/DL
HBA1C MFR BLD: 5.6 %
HIV AG/AB: NORMAL
HIV ANTIGEN: NORMAL
HIV-1 ANTIBODY: NORMAL
HIV-2 AB: NORMAL
N. GONORRHOEAE DNA, URINE: NEGATIVE
TOTAL SYPHILLIS IGG/IGM: NORMAL

## 2021-04-21 PROCEDURE — 4004F PT TOBACCO SCREEN RCVD TLK: CPT | Performed by: STUDENT IN AN ORGANIZED HEALTH CARE EDUCATION/TRAINING PROGRAM

## 2021-04-21 PROCEDURE — G8427 DOCREV CUR MEDS BY ELIG CLIN: HCPCS | Performed by: STUDENT IN AN ORGANIZED HEALTH CARE EDUCATION/TRAINING PROGRAM

## 2021-04-21 PROCEDURE — 99214 OFFICE O/P EST MOD 30 MIN: CPT | Performed by: STUDENT IN AN ORGANIZED HEALTH CARE EDUCATION/TRAINING PROGRAM

## 2021-04-21 PROCEDURE — G8420 CALC BMI NORM PARAMETERS: HCPCS | Performed by: STUDENT IN AN ORGANIZED HEALTH CARE EDUCATION/TRAINING PROGRAM

## 2021-04-21 RX ORDER — PANTOPRAZOLE SODIUM 40 MG/1
40 TABLET, DELAYED RELEASE ORAL DAILY
Qty: 60 TABLET | Refills: 2 | Status: SHIPPED | OUTPATIENT
Start: 2021-04-21

## 2021-04-21 ASSESSMENT — ENCOUNTER SYMPTOMS
BLOOD IN STOOL: 0
SHORTNESS OF BREATH: 0
WHEEZING: 0
COUGH: 0
CONSTIPATION: 1
ABDOMINAL DISTENTION: 0

## 2021-04-21 NOTE — PATIENT INSTRUCTIONS
Patient Education        Anemia: Care Instructions  Your Care Instructions     Anemia is a low level of red blood cells, which carry oxygen throughout your body. Many things can cause anemia. Lack of iron is one of the most common causes. Your body needs iron to make hemoglobin, a substance in red blood cells that carries oxygen from the lungs to your body's cells. Without enough iron, the body produces fewer and smaller red blood cells. As a result, your body's cells do not get enough oxygen, and you feel tired and weak. And you may have trouble concentrating. Bleeding is the most common cause of a lack of iron. You may have heavy menstrual bleeding or bleeding caused by conditions such as ulcers, hemorrhoids, or cancer. Regular use of aspirin or other anti-inflammatory medicines (such as ibuprofen) also can cause bleeding in some people. A lack of iron in your diet also can cause anemia, especially at times when the body needs more iron, such as during pregnancy, infancy, and the teen years. Your doctor may have prescribed iron pills. It may take several months of treatment for your iron levels to return to normal. Your doctor also may suggest that you eat foods that are rich in iron, such as meat and beans. There are many other causes of anemia. It is not always due to a lack of iron. Finding the specific cause of your anemia will help your doctor find the right treatment for you. Follow-up care is a key part of your treatment and safety. Be sure to make and go to all appointments, and call your doctor if you are having problems. It's also a good idea to know your test results and keep a list of the medicines you take. How can you care for yourself at home? · Take your medicines exactly as prescribed. Call your doctor if you think you are having a problem with your medicine.   · If your doctor recommends iron pills, take them as directed:  ? Try to take the pills on an empty stomach about 1 hour before or increased shortness of breath.     · You are dizzy or lightheaded, or you feel like you may faint.     · Your fatigue and weakness continue or get worse.     · You have any abnormal bleeding, such as:  ? Nosebleeds. ? Vaginal bleeding that is different (heavier, more frequent, at a different time of the month) than what you are used to.  ? Bloody or black stools, or rectal bleeding. ? Bloody or pink urine. Watch closely for changes in your health, and be sure to contact your doctor if:    · You do not get better as expected. Where can you learn more? Go to https://[employer]iceweb[employer]. org and sign in to your [employer] account. Enter R301 in the [employer] box to learn more about \"Anemia: Care Instructions. \"     If you do not have an account, please click on the \"Sign Up Now\" link. Current as of: September 23, 2020               Content Version: 12.8  © 2006-2021 Healthwise, [facility]. Care instructions adapted under license by [facility] ([facility]). If you have questions about a medical condition or this instruction, always ask your healthcare professional. [place] [address] any warranty or liability for your use of this information.

## 2021-04-21 NOTE — PROGRESS NOTES
2021     Prema Quispe (:  1990) is a 27 y.o. male, here for evaluation of the following medical concerns:    HPI  Mood Disorder:  Patient presents for follow-up of depression. Current complaints include: anhedonia, depressed mood, tearfulness, feelings of hopelessness and feelings of worthlessness/excessive guilt. He denies increased use of drugs or alcohol and suicidal thoughts or behavior. Symptoms/signs of william: none. External stressors: nothing new. Current treatment includes: Zoloft- 100. Medication side effects: none. Erectile dysfunction: Started on Viagra at his most recent appointment. He has been taking it appropriately. He has even taken 2 tabs on several occasions, but is unable to achieve erection. He denies chest pain, dizziness or shortness of breath. Hemorrhoids: Patient presents for evaluation of rectal pain. Onset of symptoms was gradual. Symptoms have been unchanged since that time. Symptoms include: bleeding which only occurs with bowel movements and painful perianal swelling. Patient denies family hx of colorectal CA and receptive anal intercourse. Treatment to date has been none. Review of Systems   Constitutional: Negative for activity change, fatigue and unexpected weight change. HENT: Negative for hearing loss. Eyes: Negative for visual disturbance. Respiratory: Negative for cough, shortness of breath and wheezing. Cardiovascular: Negative for chest pain and leg swelling. Gastrointestinal: Positive for constipation. Negative for abdominal distention and blood in stool. Endocrine: Negative for polydipsia and polyphagia. Genitourinary: Negative for discharge, dysuria, frequency, penile pain, scrotal swelling and testicular pain.        + Pain with ejaculation   Musculoskeletal: Negative for arthralgias and myalgias. Skin: Negative for rash. Allergic/Immunologic: Negative for environmental allergies.    Neurological: Negative for dizziness, weakness and headaches. Hematological: Does not bruise/bleed easily. Psychiatric/Behavioral: Positive for agitation and dysphoric mood. Negative for sleep disturbance. The patient is nervous/anxious. Prior to Visit Medications    Medication Sig Taking? Authorizing Provider   pantoprazole (PROTONIX) 40 MG tablet Take 1 tablet by mouth daily Yes Rohit Varela DO   ondansetron (ZOFRAN ODT) 4 MG disintegrating tablet Take 1 tablet by mouth every 8 hours as needed for Nausea Yes Little David MD   hydrocortisone 2.5 % cream Place rectally Yes Historical Provider, MD   lactulose (CHRONULAC) 10 GM/15ML solution  Yes Historical Provider, MD   lisinopril (PRINIVIL;ZESTRIL) 5 MG tablet  Yes Historical Provider, MD   losartan (COZAAR) 50 MG tablet losartan 50 mg tablet   Take 1 tablet every day by oral route.  Yes Historical Provider, MD   prochlorperazine (COMPAZINE) 5 MG tablet  Yes Historical Provider, MD   vitamin E 400 UNIT capsule Take 400 Units by mouth nightly Yes Historical Provider, MD   Calcium Acetate, Phos Binder, 667 MG CAPS Take 667 mg by mouth 3 times daily (with meals) Yes Historical Provider, MD   senna-docusate (PERICOLACE) 8.6-50 MG per tablet Take 2 tablets by mouth daily Yes Rohit Varela DO   ferrous sulfate (IRON 325) 325 (65 Fe) MG tablet Take 325 mg by mouth daily (with breakfast) Yes Historical Provider, MD   Cholecalciferol (VITAMIN D3) 50 MCG (2000 UT) CAPS Take 2,000 Units by mouth daily  Yes Historical Provider, MD   sertraline (ZOLOFT) 100 MG tablet Take 100 mg by mouth daily Yes Historical Provider, MD   amLODIPine (NORVASC) 5 MG tablet Take 5 mg by mouth 2 times daily  Yes Historical Provider, MD   acetaminophen (TYLENOL) 325 MG tablet Take 650 mg by mouth every 6 hours as needed for Pain Yes Historical Provider, MD        Social History     Tobacco Use    Smoking status: Current Every Day Smoker     Packs/day: 1.00     Years: 15.00     Pack years: 15.00     Types: Cigarettes    Smokeless tobacco: Never Used   Substance Use Topics    Alcohol use: Not Currently        Vitals:    04/21/21 0949   BP: 133/69   Site: Right Upper Arm   Position: Sitting   Cuff Size: Medium Adult   Pulse: 55   SpO2: 97%   Weight: 199 lb (90.3 kg)   Height: 6' 5\" (1.956 m)     Estimated body mass index is 23.6 kg/m² as calculated from the following:    Height as of this encounter: 6' 5\" (1.956 m). Weight as of this encounter: 199 lb (90.3 kg). Physical Exam  Vitals signs reviewed. Constitutional:       Appearance: Normal appearance. He is normal weight. HENT:      Head: Normocephalic and atraumatic. Nose: Nose normal.      Mouth/Throat:      Mouth: Mucous membranes are moist.      Pharynx: Oropharynx is clear. Eyes:      Extraocular Movements: Extraocular movements intact. Conjunctiva/sclera: Conjunctivae normal.   Neck:      Musculoskeletal: Normal range of motion and neck supple. Cardiovascular:      Rate and Rhythm: Normal rate and regular rhythm. Pulses: Normal pulses. Heart sounds: Normal heart sounds. Pulmonary:      Effort: Pulmonary effort is normal.      Breath sounds: Normal breath sounds. Abdominal:      General: Abdomen is flat. Bowel sounds are normal.      Palpations: Abdomen is soft. Musculoskeletal: Normal range of motion. Skin:     General: Skin is warm and dry. Capillary Refill: Capillary refill takes less than 2 seconds. Findings: No rash. Comments: Fistula left forearm, palpable thrill   Neurological:      General: No focal deficit present. Mental Status: He is alert and oriented to person, place, and time. Mental status is at baseline. Psychiatric:         Mood and Affect: Mood normal.         Behavior: Behavior normal.         Thought Content: Thought content normal.         Judgment: Judgment normal.         ASSESSMENT/PLAN:  1.  Moderate episode of recurrent major depressive disorder [facility]:: Continues to complain of some depressive symptoms. Current regimen is 100 mg of Zoloft. Discussed possibly doubling this dosage today, but he would like to continue it unchanged. 2. Vasculogenic erectile dysfunction, unspecified vasculogenic erectile dysfunction type: Erectile dysfunction remains persistent despite appropriate use of sildenafil. Suspect could be related to his chronic illness, but regardless he would like to see urology. He is also interested in some fertility testing.  - TERRY Brito MD,Urology, [facility]    3. External hemorrhoid, thrombosed: Patient showed images of severe thrombosed hemorrhoid. Will refer to Dr. Eduardo Lala. May need to wait till off dialysis prior to having surgery. - Maria E Mcpherson MD, Colorectal Surgery, [facility]    4. ESRD (end stage renal disease) on dialysis ([address]  Fistula placed and well-healed. Plan on starting dialysis in the near future. 5. Fistula of artery ([address]  As above    6. Umbilical hernia without obstruction and without gangrene  Small easily reducible umbilical hernia. Will refer to Dr. Cindy Holman for repair.  - Maria E Mcpherson MD, Colorectal Surgery, [facility]      Return in about 3 months (around 7/21/2021). An electronic signature was used to authenticate this note.     --Farideh Morales DO on 4/21/2021 at 10:57 AM

## 2021-04-22 LAB
HCV QNT BY NAAT IU/ML: ABNORMAL
HCV QNT BY NAAT LOG IU/ML: 4.95 LOG IU/ML
INTERPRETATION: DETECTED

## 2021-04-25 LAB — HEPATITIS C GENOTYPE: NORMAL

## 2021-04-27 ENCOUNTER — HOSPITAL ENCOUNTER (OUTPATIENT)
Dept: GENERAL RADIOLOGY | Age: 31
Discharge: HOME | End: 2021-04-27
Payer: COMMERCIAL

## 2021-04-27 ENCOUNTER — HOSPITAL ENCOUNTER (OUTPATIENT)
Age: 31
Discharge: HOME | End: 2021-04-27
Payer: COMMERCIAL

## 2021-04-27 DIAGNOSIS — R55 SYNCOPE AND COLLAPSE: ICD-10-CM

## 2021-04-27 DIAGNOSIS — N18.4 CHRONIC KIDNEY DISEASE, STAGE IV (SEVERE) (HCC): ICD-10-CM

## 2021-04-27 PROCEDURE — 71046 X-RAY EXAM CHEST 2 VIEWS: CPT

## 2021-04-28 ENCOUNTER — PATIENT MESSAGE (OUTPATIENT)
Dept: PRIMARY CARE CLINIC | Age: 31
End: 2021-04-28

## 2021-04-28 DIAGNOSIS — F11.93 OPIOID WITHDRAWAL (HCC): Primary | ICD-10-CM

## 2021-04-28 DIAGNOSIS — G25.81 RLS (RESTLESS LEGS SYNDROME): ICD-10-CM

## 2021-05-03 DIAGNOSIS — N52.9 VASCULOGENIC ERECTILE DYSFUNCTION, UNSPECIFIED VASCULOGENIC ERECTILE DYSFUNCTION TYPE: Primary | ICD-10-CM

## 2021-05-03 RX ORDER — TADALAFIL 5 MG/1
5 TABLET ORAL PRN
Qty: 30 TABLET | Refills: 0 | Status: SHIPPED | OUTPATIENT
Start: 2021-05-03

## 2021-05-03 NOTE — TELEPHONE ENCOUNTER
Recent Visits  Date Type Provider Dept   04/21/21 Office Visit DO Marbella Morris   03/23/21 Office Visit DO Marbella Morris   Showing recent visits within past 540 days with a meds authorizing provider and meeting all other requirements     Future Appointments  No visits were found meeting these conditions.    Showing future appointments within next 150 days with a meds authorizing provider and meeting all other requirements

## 2021-05-03 NOTE — TELEPHONE ENCOUNTER
From: Joselyn Foy  To: Filemon Erazo DO  Sent: 4/28/2021 6:02 PM EDT  Subject: Prescription Question    Hi doc I got a question. I've been talking to other pkd patients and they have been telling me about tadalafil. They say it works better then viagra.

## 2021-05-10 RX ORDER — DICYCLOMINE HYDROCHLORIDE 10 MG/1
10 CAPSULE ORAL EVERY 6 HOURS PRN
Qty: 90 CAPSULE | Refills: 5 | Status: SHIPPED | OUTPATIENT
Start: 2021-05-10 | End: 2021-10-05

## 2021-05-10 RX ORDER — CLONIDINE HYDROCHLORIDE 0.1 MG/1
.1-.2 TABLET ORAL EVERY 4 HOURS PRN
Qty: 90 TABLET | Refills: 3 | Status: ON HOLD
Start: 2021-05-10 | End: 2021-10-07 | Stop reason: HOSPADM

## 2021-05-10 RX ORDER — TRAZODONE HYDROCHLORIDE 50 MG/1
50 TABLET ORAL NIGHTLY PRN
Qty: 90 TABLET | Refills: 1 | Status: SHIPPED | OUTPATIENT
Start: 2021-05-10

## 2021-05-10 RX ORDER — PROMETHAZINE HYDROCHLORIDE 25 MG/1
25 TABLET ORAL 4 TIMES DAILY PRN
Qty: 20 TABLET | Refills: 0 | Status: SHIPPED | OUTPATIENT
Start: 2021-05-10 | End: 2021-05-17

## 2021-05-11 RX ORDER — GABAPENTIN 100 MG/1
100 CAPSULE ORAL NIGHTLY
Qty: 30 CAPSULE | Refills: 1 | Status: ON HOLD
Start: 2021-05-11 | End: 2021-10-07 | Stop reason: HOSPADM

## 2021-06-14 ENCOUNTER — PATIENT MESSAGE (OUTPATIENT)
Dept: PRIMARY CARE CLINIC | Age: 31
End: 2021-06-14

## 2021-06-14 NOTE — TELEPHONE ENCOUNTER
From: Roel Coon  To: Petros Morales DO  Sent: 6/14/2021 12:04 PM EDT  Subject: Non-Urgent Medical Question    Edu mr Flores's I was wondering if you call send me the urologists number that you gave me before. I have an appointment scheduled here soon and I lost all his info.

## 2021-07-14 PROBLEM — F11.21 OPIOID DEPENDENCE IN REMISSION (HCC): Status: ACTIVE | Noted: 2021-07-14

## 2021-08-19 ENCOUNTER — TELEPHONE (OUTPATIENT)
Dept: CARDIOLOGY CLINIC | Age: 31
End: 2021-08-19

## 2021-08-19 NOTE — TELEPHONE ENCOUNTER
Pt's phone is not accepted calls at this time. Trying to reach him to RS appt from 8.17.21 that he NS for.

## 2021-10-05 ENCOUNTER — APPOINTMENT (OUTPATIENT)
Dept: GENERAL RADIOLOGY | Age: 31
DRG: 711 | End: 2021-10-05
Payer: COMMERCIAL

## 2021-10-05 ENCOUNTER — HOSPITAL ENCOUNTER (INPATIENT)
Age: 31
LOS: 2 days | Discharge: HOME | DRG: 711 | End: 2021-10-07
Attending: EMERGENCY MEDICINE | Admitting: STUDENT IN AN ORGANIZED HEALTH CARE EDUCATION/TRAINING PROGRAM
Payer: COMMERCIAL

## 2021-10-05 DIAGNOSIS — T80.219A INFECTION OF VENOUS ACCESS PORT, INITIAL ENCOUNTER: Primary | ICD-10-CM

## 2021-10-05 DIAGNOSIS — N18.6 END STAGE RENAL DISEASE ON DIALYSIS (HCC): ICD-10-CM

## 2021-10-05 DIAGNOSIS — Z99.2 END STAGE RENAL DISEASE ON DIALYSIS (HCC): ICD-10-CM

## 2021-10-05 PROBLEM — T80.211A CATHETER-RELATED BLOODSTREAM INFECTION: Status: ACTIVE | Noted: 2021-10-05

## 2021-10-05 PROBLEM — N39.0 UTI (URINARY TRACT INFECTION): Status: ACTIVE | Noted: 2021-10-05

## 2021-10-05 LAB
A/G RATIO: 1.3 (ref 1.1–2.2)
ALBUMIN SERPL-MCNC: 4.2 G/DL (ref 3.4–5)
ALP BLD-CCNC: 61 U/L (ref 40–129)
ALT SERPL-CCNC: 7 U/L (ref 10–40)
ANION GAP SERPL CALCULATED.3IONS-SCNC: 15 MMOL/L (ref 3–16)
AST SERPL-CCNC: 9 U/L (ref 15–37)
BASOPHILS ABSOLUTE: 0 K/UL (ref 0–0.2)
BASOPHILS RELATIVE PERCENT: 0.3 %
BILIRUB SERPL-MCNC: <0.2 MG/DL (ref 0–1)
BUN BLDV-MCNC: 52 MG/DL (ref 7–20)
C-REACTIVE PROTEIN: <3 MG/L (ref 0–5.1)
CALCIUM SERPL-MCNC: 9.3 MG/DL (ref 8.3–10.6)
CHLORIDE BLD-SCNC: 102 MMOL/L (ref 99–110)
CO2: 21 MMOL/L (ref 21–32)
CREAT SERPL-MCNC: 7.7 MG/DL (ref 0.9–1.3)
EOSINOPHILS ABSOLUTE: 0.1 K/UL (ref 0–0.6)
EOSINOPHILS RELATIVE PERCENT: 1.1 %
GFR AFRICAN AMERICAN: 10
GFR NON-AFRICAN AMERICAN: 8
GLOBULIN: 3.2 G/DL
GLUCOSE BLD-MCNC: 83 MG/DL (ref 70–99)
HCT VFR BLD CALC: 36.9 % (ref 40.5–52.5)
HEMOGLOBIN: 12 G/DL (ref 13.5–17.5)
LACTIC ACID: 1.6 MMOL/L (ref 0.4–2)
LYMPHOCYTES ABSOLUTE: 1.3 K/UL (ref 1–5.1)
LYMPHOCYTES RELATIVE PERCENT: 19.2 %
MAGNESIUM: 2.3 MG/DL (ref 1.8–2.4)
MCH RBC QN AUTO: 27.2 PG (ref 26–34)
MCHC RBC AUTO-ENTMCNC: 32.6 G/DL (ref 31–36)
MCV RBC AUTO: 83.6 FL (ref 80–100)
MONOCYTES ABSOLUTE: 0.5 K/UL (ref 0–1.3)
MONOCYTES RELATIVE PERCENT: 7.7 %
NEUTROPHILS ABSOLUTE: 4.7 K/UL (ref 1.7–7.7)
NEUTROPHILS RELATIVE PERCENT: 71.7 %
PDW BLD-RTO: 14.3 % (ref 12.4–15.4)
PLATELET # BLD: 163 K/UL (ref 135–450)
PMV BLD AUTO: 9 FL (ref 5–10.5)
POTASSIUM SERPL-SCNC: 3.9 MMOL/L (ref 3.5–5.1)
PROCALCITONIN: 0.25 NG/ML (ref 0–0.15)
RBC # BLD: 4.41 M/UL (ref 4.2–5.9)
SEDIMENTATION RATE, ERYTHROCYTE: 15 MM/HR (ref 0–15)
SODIUM BLD-SCNC: 138 MMOL/L (ref 136–145)
TOTAL PROTEIN: 7.4 G/DL (ref 6.4–8.2)
WBC # BLD: 6.5 K/UL (ref 4–11)

## 2021-10-05 PROCEDURE — 71045 X-RAY EXAM CHEST 1 VIEW: CPT

## 2021-10-05 PROCEDURE — 93005 ELECTROCARDIOGRAM TRACING: CPT | Performed by: EMERGENCY MEDICINE

## 2021-10-05 PROCEDURE — 1200000000 HC SEMI PRIVATE

## 2021-10-05 PROCEDURE — 80053 COMPREHEN METABOLIC PANEL: CPT

## 2021-10-05 PROCEDURE — 36415 COLL VENOUS BLD VENIPUNCTURE: CPT

## 2021-10-05 PROCEDURE — 0J960ZZ DRAINAGE OF CHEST SUBCUTANEOUS TISSUE AND FASCIA, OPEN APPROACH: ICD-10-PCS | Performed by: PHYSICIAN ASSISTANT

## 2021-10-05 PROCEDURE — 96367 TX/PROPH/DG ADDL SEQ IV INF: CPT

## 2021-10-05 PROCEDURE — 10060 I&D ABSCESS SIMPLE/SINGLE: CPT

## 2021-10-05 PROCEDURE — 86140 C-REACTIVE PROTEIN: CPT

## 2021-10-05 PROCEDURE — 2500000003 HC RX 250 WO HCPCS: Performed by: PHYSICIAN ASSISTANT

## 2021-10-05 PROCEDURE — 6360000002 HC RX W HCPCS: Performed by: PHYSICIAN ASSISTANT

## 2021-10-05 PROCEDURE — 83605 ASSAY OF LACTIC ACID: CPT

## 2021-10-05 PROCEDURE — 85652 RBC SED RATE AUTOMATED: CPT

## 2021-10-05 PROCEDURE — 87205 SMEAR GRAM STAIN: CPT

## 2021-10-05 PROCEDURE — 99284 EMERGENCY DEPT VISIT MOD MDM: CPT

## 2021-10-05 PROCEDURE — 96365 THER/PROPH/DIAG IV INF INIT: CPT

## 2021-10-05 PROCEDURE — 85025 COMPLETE CBC W/AUTO DIFF WBC: CPT

## 2021-10-05 PROCEDURE — 84145 PROCALCITONIN (PCT): CPT

## 2021-10-05 PROCEDURE — 87070 CULTURE OTHR SPECIMN AEROBIC: CPT

## 2021-10-05 PROCEDURE — 96361 HYDRATE IV INFUSION ADD-ON: CPT

## 2021-10-05 PROCEDURE — 83735 ASSAY OF MAGNESIUM: CPT

## 2021-10-05 PROCEDURE — 2580000003 HC RX 258: Performed by: PHYSICIAN ASSISTANT

## 2021-10-05 PROCEDURE — 87040 BLOOD CULTURE FOR BACTERIA: CPT

## 2021-10-05 RX ORDER — HEPARIN SODIUM 5000 [USP'U]/ML
5000 INJECTION, SOLUTION INTRAVENOUS; SUBCUTANEOUS EVERY 8 HOURS SCHEDULED
Status: DISCONTINUED | OUTPATIENT
Start: 2021-10-05 | End: 2021-10-07 | Stop reason: HOSPADM

## 2021-10-05 RX ORDER — LISINOPRIL 5 MG/1
5 TABLET ORAL DAILY
Status: DISCONTINUED | OUTPATIENT
Start: 2021-10-06 | End: 2021-10-07 | Stop reason: HOSPADM

## 2021-10-05 RX ORDER — SODIUM CHLORIDE 0.9 % (FLUSH) 0.9 %
5-40 SYRINGE (ML) INJECTION EVERY 12 HOURS SCHEDULED
Status: DISCONTINUED | OUTPATIENT
Start: 2021-10-05 | End: 2021-10-07 | Stop reason: HOSPADM

## 2021-10-05 RX ORDER — AMLODIPINE BESYLATE 5 MG/1
5 TABLET ORAL 2 TIMES DAILY
Status: DISCONTINUED | OUTPATIENT
Start: 2021-10-05 | End: 2021-10-07 | Stop reason: HOSPADM

## 2021-10-05 RX ORDER — SODIUM CHLORIDE 0.9 % (FLUSH) 0.9 %
5-40 SYRINGE (ML) INJECTION PRN
Status: DISCONTINUED | OUTPATIENT
Start: 2021-10-05 | End: 2021-10-07 | Stop reason: HOSPADM

## 2021-10-05 RX ORDER — FERROUS SULFATE TAB EC 324 MG (65 MG FE EQUIVALENT) 324 (65 FE) MG
324 TABLET DELAYED RESPONSE ORAL
Status: DISCONTINUED | OUTPATIENT
Start: 2021-10-06 | End: 2021-10-06

## 2021-10-05 RX ORDER — CLINDAMYCIN PHOSPHATE 600 MG/50ML
600 INJECTION INTRAVENOUS ONCE
Status: COMPLETED | OUTPATIENT
Start: 2021-10-05 | End: 2021-10-05

## 2021-10-05 RX ORDER — SODIUM CHLORIDE 9 MG/ML
25 INJECTION, SOLUTION INTRAVENOUS PRN
Status: DISCONTINUED | OUTPATIENT
Start: 2021-10-05 | End: 2021-10-07 | Stop reason: HOSPADM

## 2021-10-05 RX ORDER — ACETAMINOPHEN 650 MG/1
650 SUPPOSITORY RECTAL EVERY 6 HOURS PRN
Status: DISCONTINUED | OUTPATIENT
Start: 2021-10-05 | End: 2021-10-07 | Stop reason: HOSPADM

## 2021-10-05 RX ORDER — ACETAMINOPHEN 325 MG/1
650 TABLET ORAL EVERY 6 HOURS PRN
Status: ON HOLD | COMMUNITY
End: 2021-10-07 | Stop reason: HOSPADM

## 2021-10-05 RX ORDER — PANTOPRAZOLE SODIUM 40 MG/1
40 TABLET, DELAYED RELEASE ORAL DAILY
Status: DISCONTINUED | OUTPATIENT
Start: 2021-10-06 | End: 2021-10-07 | Stop reason: HOSPADM

## 2021-10-05 RX ORDER — BUPRENORPHINE AND NALOXONE 8; 2 MG/1; MG/1
1.5 FILM, SOLUBLE BUCCAL; SUBLINGUAL DAILY
COMMUNITY

## 2021-10-05 RX ORDER — TRAZODONE HYDROCHLORIDE 50 MG/1
50 TABLET ORAL NIGHTLY PRN
Status: DISCONTINUED | OUTPATIENT
Start: 2021-10-05 | End: 2021-10-07 | Stop reason: HOSPADM

## 2021-10-05 RX ORDER — 0.9 % SODIUM CHLORIDE 0.9 %
500 INTRAVENOUS SOLUTION INTRAVENOUS ONCE
Status: COMPLETED | OUTPATIENT
Start: 2021-10-05 | End: 2021-10-05

## 2021-10-05 RX ORDER — ACETAMINOPHEN 325 MG/1
650 TABLET ORAL EVERY 6 HOURS PRN
Status: DISCONTINUED | OUTPATIENT
Start: 2021-10-05 | End: 2021-10-07 | Stop reason: HOSPADM

## 2021-10-05 RX ADMIN — CEFEPIME HYDROCHLORIDE 2000 MG: 2 INJECTION, POWDER, FOR SOLUTION INTRAVENOUS at 19:36

## 2021-10-05 RX ADMIN — VANCOMYCIN HYDROCHLORIDE 1250 MG: 10 INJECTION, POWDER, LYOPHILIZED, FOR SOLUTION INTRAVENOUS at 20:42

## 2021-10-05 RX ADMIN — SODIUM CHLORIDE 500 ML: 9 INJECTION, SOLUTION INTRAVENOUS at 18:10

## 2021-10-05 RX ADMIN — CLINDAMYCIN PHOSPHATE 600 MG: 600 INJECTION, SOLUTION INTRAVENOUS at 17:40

## 2021-10-05 ASSESSMENT — PAIN SCALES - GENERAL
PAINLEVEL_OUTOF10: 0

## 2021-10-05 NOTE — ED PROVIDER NOTES
[address] [facility]        Pt Name: Sonido Steel  MRN: [id number]  [place] 1990  Date of evaluation: 10/5/2021  Provider: Ye Alfredo PA-C  PCP: Rachel Holbrook DO  Note Started: 4:54 PM EDT       I have seen and evaluated this patient with my supervising physician Trish Mcdonald MD.      Triage CHIEF COMPLAINT       Chief Complaint   Patient presents with    Other     Pt reports wound infection that he \"first noticed this morning\". Pt states that he has \"kidney disease and is on dialysis\". Pt states that he has \"missed dialysis for a month\". Pt alert and oriented and denies any pain at this time,    Wound Infection         HISTORY OF PRESENT ILLNESS   (Location/Symptom, Timing/Onset, Context/Setting, Quality, Duration, Modifying Factors, Severity)  Note limiting factors. Chief Complaint: infection on right chest.    Sonido Steel is a 32 y.o. 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 He indicates that he decided that he should come in to be evaluated and treated. He states that he does have end-stage renal disease requiring dialysis. He is still producing urine but it burns a bit when he urinates. Patient states that because of drug use he has not been on any of his medications for at least a month and more like 2 or even longer he thinks. He thinks may be the last time he had dialysis could have been towards the beginning of September or before that. However patient states that he is willing to do what is medically needed at this time. No shortness of breath or chest pain including over the area of redness and swelling per patient. Nursing Notes were all reviewed and agreed with or any disagreements were addressed in the HPI.     REVIEW OF SYSTEMS    (2-9 mouth daily    SERTRALINE (ZOLOFT) 100 MG TABLET    Take 100 mg by mouth daily    TADALAFIL (CIALIS) 5 MG TABLET    Take 1 tablet by mouth as needed for Erectile Dysfunction    TRAZODONE (DESYREL) 50 MG TABLET    Take 1 tablet by mouth nightly as needed for Sleep    VITAMIN E 400 UNIT CAPSULE    Take 400 Units by mouth nightly       ALLERGIES     Patient has no known allergies. FAMILYHISTORY     History reviewed. No pertinent family history. SOCIAL HISTORY       Social History     Socioeconomic History    Marital status: Single     Spouse name: None    Number of children: None    Years of education: None    Highest education level: None   Occupational History    None   Tobacco Use    Smoking status: Current Every Day Smoker     Packs/day: 1.00     Years: 15.00     Pack years: 15.00     Types: Cigarettes    Smokeless tobacco: Never Used   Vaping Use    Vaping Use: Never used   Substance and Sexual Activity    Alcohol use: Not Currently    Drug use: Not Currently     Comment: Hx of Overdose in Care Everywhere    Sexual activity: Yes     Partners: Female   Other Topics Concern    None   Social History Narrative    None     Social Determinants of Health     Financial Resource Strain:     Difficulty of Paying Living Expenses:    Food Insecurity:     Worried About Running Out of Food in the Last Year:     Ran Out of Food in the Last Year:    Transportation Needs:     Lack of Transportation (Medical):      Lack of Transportation (Non-Medical):    Physical Activity:     Days of Exercise per Week:     Minutes of Exercise per Session:    Stress:     Feeling of Stress :    Social Connections:     Frequency of Communication with Friends and Family:     Frequency of Social Gatherings with Friends and Family:     Attends Cheondoism Services:     Active Member of Clubs or Organizations:     Attends Club or Organization Meetings:     Marital Status:    Intimate Partner Violence:     Fear of Current or Ex-Partner:     Emotionally Abused:     Physically Abused:     Sexually Abused:        SCREENINGS    Gene Coma Scale  Eye Opening: Spontaneous  Best Verbal Response: Oriented  Best Motor Response: Obeys commands  Gene Coma Scale Score: 15        PHYSICAL EXAM    (up to 7 for level 4, 8 or more for level 5)     ED Triage Vitals   BP Temp Temp Source Pulse Resp SpO2 Height Weight   10/05/21 1545 10/05/21 1638 10/05/21 1638 10/05/21 1545 10/05/21 1545 10/05/21 1545 -- --   139/86 98.5 °F (36.9 °C) Oral 105 17 96 %         Physical Exam  Vitals and nursing note reviewed. Constitutional:       Appearance: Normal appearance. He is not diaphoretic. HENT:      Head: Normocephalic and atraumatic. Right Ear: External ear normal.      Left Ear: External ear normal.      Nose: Nose normal.      Mouth/Throat:      Mouth: Mucous membranes are moist.      Pharynx: No posterior oropharyngeal erythema. Comments: Gag reflex intact  Eyes:      General:         Right eye: No discharge. Left eye: No discharge. Conjunctiva/sclera: Conjunctivae normal.   Cardiovascular:      Rate and Rhythm: Normal rate and regular rhythm. Pulses: Normal pulses. Heart sounds: Normal heart sounds. No murmur heard. No gallop. Pulmonary:      Effort: Pulmonary effort is normal. No respiratory distress. Breath sounds: Normal breath sounds. No wheezing, rhonchi or rales. Chest:       Abdominal:      General: Bowel sounds are normal. There is no distension. Palpations: Abdomen is soft. Tenderness: There is no abdominal tenderness. There is no right CVA tenderness, left CVA tenderness, guarding or rebound. Musculoskeletal:         General: No swelling, tenderness, deformity or signs of injury. Normal range of motion. Cervical back: Normal range of motion and neck supple. No rigidity or tenderness. Right lower leg: No edema. Left lower leg: No edema.    Skin:     General: Skin is warm and dry. Capillary Refill: Capillary refill takes less than 2 seconds. Comments: A small amount of redness circles the opening for the port on the right chest and also a small fluctuant abscess is located a bit medially over the tract of the dialysis port. Neurological:      Mental Status: He is alert and oriented to person, place, and time. Mental status is at baseline.    Psychiatric:         Mood and Affect: Mood normal.         Behavior: Behavior normal.         DIAGNOSTIC RESULTS   LABS:    Labs Reviewed   CBC WITH AUTO DIFFERENTIAL - Abnormal; Notable for the following components:       Result Value    Hemoglobin 12.0 (*)     Hematocrit 36.9 (*)     All other components within normal limits    Narrative:     Performed at:  [facility]  [address] [address]   Phone [phone]   COMPREHENSIVE METABOLIC PANEL - Abnormal; Notable for the following components:    BUN 52 (*)     CREATININE 7.7 (*)     GFR Non- 8 (*)     GFR  10 (*)     ALT 7 (*)     AST 9 (*)     All other components within normal limits    Narrative:     Basil Alvarez tel. [phone],  Chemistry results called to and read back by Aston Cortes., 10/05/2021 18:12, by  [facility]  Performed at:  [facility]  [address], [facility] [address]   Phone [phone]   PROCALCITONIN - Abnormal; Notable for the following components:    Procalcitonin 0.25 (*)     All other components within normal limits    Narrative:     Performed at:  [facility]  [address], [place] [facility] [address]   Phone [phone]   CULTURE, BLOOD 1   CULTURE, BLOOD 2   CULTURE, WOUND   LACTIC ACID, PLASMA    Narrative:     Performed at:  [facility]  [address], [facility] [address]   Phone [phone]   MAGNESIUM    Narrative:     Basil Alvarez tel. [phone],  Chemistry results called to and read back by Chet Morrison., 10/05/2021 18:12, by  [facility]  Performed at:  [facility]  [address] [place], [place] [address]   Phone [phone]   SEDIMENTATION RATE    Narrative:     Performed at:  [facility]  [address] [facility] [place] [facility] [address]   Phone [phone]   [address]       When ordered, only abnormal lab results are displayed. All other labs were within normal range or not returned as of this dictation. EKG: When ordered, EKG's are interpreted by the Emergency Department Physician in the absence of a cardiologist.  Please see their note for interpretation of EKG. RADIOLOGY:   Non-plain film images such as CT, Ultrasound and MRI are read by the radiologist. Plain radiographic images are visualized andpreliminarily interpreted by the  ED Provider with the below findings:        Interpretation perthe Radiologist below, if available at the time of this note:    XR CHEST 1 VIEW   Final Result   Pulmonary emphysema with no acute process           No results found. PROCEDURES   Unless otherwise noted below, none     Incision/Drainage    Date/Time: 10/5/2021 6:01 PM  Performed by: Segundo Cardenas PA-C  Authorized by: Merle Del Rosario MD     Consent:     Consent obtained:  Verbal    Consent given by:  Patient  Location:     Type:  Abscess    Location:  Trunk    Trunk location:  Chest  Pre-procedure details:     Skin preparation:  Betadine  Anesthesia (see MAR for exact dosages): Anesthesia method:  Local infiltration    Local anesthetic:  Lidocaine 1% w/o epi  Procedure type:     Complexity:  Simple  Procedure details:     Needle aspiration: yes      Needle gauge: 19 gauge.     Incision types:  Stab incision    Incision depth:  Subcutaneous (Entrance through the dermis to the subcutaneous space is gently and carefully made with the needle)    Wound management:  Probed and deloculated    Drainage:  Purulent    Drainage amount:  Scant    Wound treatment:  Wound left open    Packing material: Sterile bandage with tape placed. Post-procedure details:     Patient tolerance of procedure: Tolerated well, no immediate complications  Comments:      Wound culture obtained and sent down to lab. CRITICAL CARE TIME   N/A    CONSULTS:  IP CONSULT TO NEPHROLOGY      EMERGENCY DEPARTMENT COURSE and DIFFERENTIAL DIAGNOSIS/MDM:   Vitals:    Vitals:    10/05/21 1545 10/05/21 1638   BP: 139/86    Pulse: 105    Resp: 17    Temp:  98.5 °F (36.9 °C)   TempSrc:  Oral   SpO2: 96%        Patient was given thefollowing medications:  Medications   vancomycin (VANCOCIN) 1250 mg in dextrose 5 % 250 mL IVPB (has no administration in time range)   cefepime (MAXIPIME) 2000 mg IVPB minibag (has no administration in time range)   cefepime (MAXIPIME) 1000 mg IVPB minibag (has no administration in time range)   clindamycin (CLEOCIN) 600 mg in dextrose 5 % 50 mL IVPB (0 mg IntraVENous Stopped 10/5/21 1808)   0.9 % sodium chloride bolus (0 mLs IntraVENous Stopped 10/5/21 1921)         This patient presents as above and evaluation and treatment is begun here. Some IV fluids and antibiotics are ordered to be begun after cultures and lactic acid obtained. Decompression of the abscess performed as noted in the procedure note above and wound culture is sent down to lab.  1 view chest x-ray obtained and returns as above. CBC returns showing mild anemia with H&H 12.0 and 36.9. CMP comes back showing BUN 52 and creatinine 7.7. Procalcitonin is minimally elevated at 0.25. Consultation to Dr. Banks Roots requested at this time. Dr. Phebe Goldmann is covering and recommends 1.2 g vancomycin IV now, cefepime 2 g now, vancomycin trough in the morning, and 1 g of cefepime tomorrow morning as well as admission to the hospitalist for further care and treatment.   He indicates that the plan is to replace the infected right chest dialysis port and provide dialysis for the patient. Patient is in fair condition. Orders placed as above. Consultation to hospitalist placed. FINAL IMPRESSION      1. Infection of venous access port, initial encounter    2. End stage renal disease on dialysis [facility])          DISPOSITION/PLAN   DISPOSITION        PATIENT REFERREDTO:  No follow-up provider specified. DISCHARGE MEDICATIONS:  New Prescriptions    No medications on file       DISCONTINUED MEDICATIONS:  Discontinued Medications    No medications on file              (Please note that portions ofthis note were completed with a voice recognition program.  Efforts were made to edit the dictations but occasionally words are mis-transcribed.)    Ye Alfredo PA-C (electronically signed)             Ye Alfredo PA-C  10/05/21 1921    Addendum made on 10-14-21 for purposes of procedure charting clarification as requested. Thank you.       Ye Alfredo PA-C  10/14/21 1002

## 2021-10-05 NOTE — ED PROVIDER NOTES
is noncompliant we consulted with nephrology and spoke to Dr. Earle Gibson. He recommended that patient be admitted for possible dialysis. Hospitalist has been consulted pending admission. EKG by my preliminary interpretation shows sinus rhythm with rate of 81, normal axis, normal intervals, with no ST changes indicative of ischemia at this time.     I have reviewed and interpreted all of the currently available lab results and diagnostics from this visit:  Results for orders placed or performed during the hospital encounter of 10/05/21   Culture, Wound    Specimen: Chest   Result Value Ref Range    WOUND/ABSCESS No growth to date    CBC Auto Differential   Result Value Ref Range    WBC 6.5 4.0 - 11.0 K/uL    RBC 4.41 4.20 - 5.90 M/uL    Hemoglobin 12.0 (L) 13.5 - 17.5 g/dL    Hematocrit 36.9 (L) 40.5 - 52.5 %    MCV 83.6 80.0 - 100.0 fL    MCH 27.2 26.0 - 34.0 pg    MCHC 32.6 31.0 - 36.0 g/dL    RDW 14.3 12.4 - 15.4 %    Platelets 511 382 - 107 K/uL    MPV 9.0 5.0 - 10.5 fL    Neutrophils % 71.7 %    Lymphocytes % 19.2 %    Monocytes % 7.7 %    Eosinophils % 1.1 %    Basophils % 0.3 %    Neutrophils Absolute 4.7 1.7 - 7.7 K/uL    Lymphocytes Absolute 1.3 1.0 - 5.1 K/uL    Monocytes Absolute 0.5 0.0 - 1.3 K/uL    Eosinophils Absolute 0.1 0.0 - 0.6 K/uL    Basophils Absolute 0.0 0.0 - 0.2 K/uL   Comprehensive Metabolic Panel   Result Value Ref Range    Sodium 138 136 - 145 mmol/L    Potassium 3.9 3.5 - 5.1 mmol/L    Chloride 102 99 - 110 mmol/L    CO2 21 21 - 32 mmol/L    Anion Gap 15 3 - 16    Glucose 83 70 - 99 mg/dL    BUN 52 (H) 7 - 20 mg/dL    CREATININE 7.7 (HH) 0.9 - 1.3 mg/dL    GFR Non-African American 8 (A) >60    GFR  10 (A) >60    Calcium 9.3 8.3 - 10.6 mg/dL    Total Protein 7.4 6.4 - 8.2 g/dL    Albumin 4.2 3.4 - 5.0 g/dL    Albumin/Globulin Ratio 1.3 1.1 - 2.2    Total Bilirubin <0.2 0.0 - 1.0 mg/dL    Alkaline Phosphatase 61 40 - 129 U/L    ALT 7 (L) 10 - 40 U/L    AST 9 (L) 15 - 37 U/L Globulin 3.2 g/dL   Lactic Acid, Plasma   Result Value Ref Range    Lactic Acid 1.6 0.4 - 2.0 mmol/L   Magnesium   Result Value Ref Range    Magnesium 2.30 1.80 - 2.40 mg/dL   Sedimentation Rate   Result Value Ref Range    Sed Rate 15 0 - 15 mm/Hr   C-Reactive Protein   Result Value Ref Range    CRP <3.0 0.0 - 5.1 mg/L   Procalcitonin   Result Value Ref Range    Procalcitonin 0.25 (H) 0.00 - 0.15 ng/mL   Lactate, Sepsis   Result Value Ref Range    Lactic Acid, Sepsis 1.7 0.4 - 1.9 mmol/L   Lactate, Sepsis   Result Value Ref Range    Lactic Acid, Sepsis 1.7 0.4 - 1.9 mmol/L   CBC auto differential   Result Value Ref Range    WBC 7.6 4.0 - 11.0 K/uL    RBC 4.08 (L) 4.20 - 5.90 M/uL    Hemoglobin 11.1 (L) 13.5 - 17.5 g/dL    Hematocrit 34.5 (L) 40.5 - 52.5 %    MCV 84.6 80.0 - 100.0 fL    MCH 27.3 26.0 - 34.0 pg    MCHC 32.3 31.0 - 36.0 g/dL    RDW 14.7 12.4 - 15.4 %    Platelets 340 600 - 806 K/uL    MPV 8.8 5.0 - 10.5 fL    Neutrophils % 61.8 %    Lymphocytes % 25.1 %    Monocytes % 8.5 %    Eosinophils % 4.1 %    Basophils % 0.5 %    Neutrophils Absolute 4.7 1.7 - 7.7 K/uL    Lymphocytes Absolute 1.9 1.0 - 5.1 K/uL    Monocytes Absolute 0.6 0.0 - 1.3 K/uL    Eosinophils Absolute 0.3 0.0 - 0.6 K/uL    Basophils Absolute 0.0 0.0 - 0.2 K/uL   Basic Metabolic Panel   Result Value Ref Range    Sodium 132 (L) 136 - 145 mmol/L    Potassium 3.8 3.5 - 5.1 mmol/L    Chloride 100 99 - 110 mmol/L    CO2 18 (L) 21 - 32 mmol/L    Anion Gap 14 3 - 16    Glucose 112 (H) 70 - 99 mg/dL    BUN 61 (H) 7 - 20 mg/dL    CREATININE 7.0 (HH) 0.9 - 1.3 mg/dL    GFR Non-African American 9 (A) >60    GFR  11 (A) >60    Calcium 8.6 8.3 - 10.6 mg/dL   Magnesium   Result Value Ref Range    Magnesium 2.00 1.80 - 2.40 mg/dL   Vancomycin, Trough   Result Value Ref Range    Vancomycin Tr 24.1 (HH) 10.0 - 20.0 ug/mL   Urine Reflex to Culture    Specimen: Urine, clean catch   Result Value Ref Range    Color, UA YELLOW Straw/Yellow Clarity, UA Clear Clear    Glucose, Ur Negative Negative mg/dL    Bilirubin Urine Negative Negative    Ketones, Urine Negative Negative mg/dL    Specific Gravity, UA 1.009 1.005 - 1.030    Blood, Urine SMALL (A) Negative    pH, UA 7.0 5.0 - 8.0    Protein,  (A) Negative mg/dL    Urobilinogen, Urine 0.2 <2.0 E.U./dL    Nitrite, Urine Negative Negative    Leukocyte Esterase, Urine Negative Negative    Microscopic Examination YES     Urine Type not givn     Urine Reflex to Culture Not Indicated    Microscopic Urinalysis   Result Value Ref Range    Hyaline Casts, UA 0 0 - 8 /LPF    WBC, UA 1 0 - 5 /HPF    RBC, UA 6 (H) 0 - 4 /HPF    Epithelial Cells, UA 0 0 - 5 /HPF   EKG 12 Lead   Result Value Ref Range    Ventricular Rate 81 BPM    Atrial Rate 81 BPM    P-R Interval 144 ms    QRS Duration 98 ms    Q-T Interval 392 ms    QTc Calculation (Bazett) 455 ms    P Axis 20 degrees    R Axis 6 degrees    T Axis 57 degrees    Diagnosis       Normal sinus rhythmNonspecific T wave abnormalityAbnormal ECGWhen compared with ECG of 18-JAN-2021 03:58,Nonspecific T wave abnormality, worse in Anterior leadsConfirmed by CHELSEA GRIER MD ([id number]) on 10/6/2021 10:16:12 AM     XR CHEST 1 VIEW    Result Date: 10/5/2021  EXAMINATION: ONE XRAY VIEW OF THE CHEST 10/5/2021 4:53 pm COMPARISON: 04/27/2021 HISTORY: ORDERING SYSTEM PROVIDED HISTORY: missed HD. ? infected chest port TECHNOLOGIST PROVIDED HISTORY: Reason for exam:->missed HD. ? infected chest port Reason for Exam: Infection of HD Acuity: Acute Type of Exam: Initial FINDINGS: Interval placement of a right IJ dialysis catheter. No pneumothorax. Heart size and pulmonary vasculature are stable. Large bulla in the right lung apex, with bullous changes throughout the right lung.   No acute infiltrate or edema     Pulmonary emphysema with no acute process       ED Medication Orders (From admission, onward)    Start Ordered     Status Ordering Provider    10/06/21 1000 10/06/21 0992 buprenorphine-naloxone (SUBOXONE) 8-2 MG SL film 1.5 Film  DAILY      Last MAR action: Given - by Clau Parada on 10/06/21 at [address]    10/06/21 0900 10/05/21 2320  lisinopril (PRINIVIL;ZESTRIL) tablet 5 mg  DAILY      Last MAR action: Held - by Clau Parada on 10/06/21 at [address], [facility]    10/06/21 0900 10/05/21 2320  pantoprazole (PROTONIX) tablet 40 mg  DAILY      Last MAR action: Given - by Clau Parada on 10/06/21 at [place] [address], [facility]    10/06/21 0900 10/06/21 0001  cefepime (MAXIPIME) 1000 mg IVPB minibag  EVERY 12 HOURS     Question:  Antimicrobial Indications  Answer:  Sepsis of Unknown Etiology    Last MAR action: Stopped - by Clau Parada on 10/06/21 at [address] [facility]    10/06/21 0130 10/06/21 0102  vancomycin (VANCOCIN) intermittent dosing (placeholder)  RX Placeholder     Question:  Antimicrobial Indications  Answer:  Sepsis of Unknown Etiology    Last MAR action: Not Given - by Marc Flynn on 10/06/21 at [facility], [address]    10/05/21 2345 10/05/21 2320  amLODIPine (NORVASC) tablet 5 mg  2 TIMES DAILY      Last MAR action: Held - by Clau Parada on 10/06/21 at [facility], [address]    10/05/21 2345 10/05/21 2320  sodium chloride flush 0.9 % injection 5-40 mL  2 times per day      Last MAR action: Given - by Clau Parada on 10/06/21 at [facility], [address]    10/05/21 2345 10/05/21 2320  heparin (porcine) injection 5,000 Units  3 times per day      Last MAR action: Not Given - by Clau Parada on 10/06/21 at [address], [address]    10/05/21 2339 10/05/21 2320  traZODone (DESYREL) tablet 50 mg  NIGHTLY PRN      Last MAR action: Given - by Marc All on 10/06/21 at [address], [address]    10/05/21 2320 10/05/21 2320  sodium chloride flush 0.9 % injection 5-40 mL  PRN      Acknowledged JOSLYN JONES    10/05/21 2320 10/05/21 2320  acetaminophen (TYLENOL) tablet 650 mg  EVERY 6 HOURS PRN      Acknowledged Kerri JONES [address] 10/05/21 2320 10/05/21 2320  acetaminophen (TYLENOL) suppository 650 mg  EVERY 6 HOURS PRN      Acknowledged JOSLYN JONES    10/05/21 2320 10/05/21 2320  0.9 % sodium chloride infusion  PRN      Acknowledged JOSLYN JONES    10/05/21 2015 10/05/21 1906  vancomycin (VANCOCIN) 1250 mg in dextrose 5 % 250 mL IVPB  ONCE     Question:  Antimicrobial Indications  Answer:  Skin and Soft Tissue Infection    Last MAR action: Stopped - by Georgie Sero on 10/05/21 at [facility], [address] [facility]    10/05/21 1915 10/05/21 1906  cefepime (MAXIPIME) 2000 mg IVPB minibag  ONCE     Question:  Antimicrobial Indications  Answer:  Skin and Soft Tissue Infection    Last MAR action: Stopped - by SANDRA [facility]URILLO [facility]05[facility]    10/05/21 1645 10/05/21 1631  clindamycin (CLEOCIN) 600 mg in dextrose 5 % 50 mL IVPB  ONCE     Question:  Antimicrobial Indications  Answer:  Skin and Soft Tissue Infection    Last MAR action: Stopped - by Simmie Cost on 10/05/21 at [address] [facility]    10/05/21 1645 10/05/21 1631  0.9 % sodium chloride bolus  ONCE      Last MAR action: Stopped - by Simmie Cost on 10/05/21 at [facility], [address] [place] [address]          Final Impression      1. Infection of venous access port, initial encounter    2. End stage renal disease on dialysis [facility])        DISPOSITION Admitted 10/05/2021 09:46:18 PM       This record is transcribed utilizing voice recognition technology. There are inherent limitations in this technology. In addition, there may be limitations in editing of this report. If there are any discrepancies, please contact me directly.     Trish Mcdonald MD   10/6/2021         Ron Oropeza MD  10/06/21 2008

## 2021-10-05 NOTE — H&P
mouth every 6 hours as needed for Pain   Yes Historical Provider, MD   vitamin D (CHOLECALCIFEROL) 50169 UNIT CAPS Take 2,000 Units by mouth daily 9/13/21  Yes Historical Provider, MD   gabapentin (NEURONTIN) 100 MG capsule Take 1 capsule by mouth nightly for 30 days. Intended supply: 90 days 5/11/21 10/5/21 Yes Newbury Loosen, DO   traZODone (DESYREL) 50 MG tablet Take 1 tablet by mouth nightly as needed for Sleep 5/10/21  Yes Newbury Loosen, DO   pantoprazole (PROTONIX) 40 MG tablet Take 1 tablet by mouth daily 4/21/21  Yes Newbury Loosen, DO   ondansetron (ZOFRAN ODT) 4 MG disintegrating tablet Take 1 tablet by mouth every 8 hours as needed for Nausea 4/6/21  Yes Kumar Mendez MD   lisinopril (PRINIVIL;ZESTRIL) 5 MG tablet Take 5 mg by mouth daily  8/6/20  Yes Historical Provider, MD   vitamin E 400 UNIT capsule Take 400 Units by mouth nightly 3/18/21  Yes Historical Provider, MD   Calcium Acetate, Phos Binder, 667 MG CAPS Take 667 mg by mouth 3 times daily (with meals) 3/12/21  Yes Historical Provider, MD   ferrous sulfate (IRON 325) 325 (65 Fe) MG tablet Take 325 mg by mouth daily (with breakfast)   Yes Historical Provider, MD   amLODIPine (NORVASC) 5 MG tablet Take 5 mg by mouth 2 times daily    Yes Historical Provider, MD   cloNIDine (CATAPRES) 0.1 MG tablet Take 1-2 tablets by mouth every 4 hours as needed for Other (anxiousness) 5/10/21   Keshia Loosen, DO   tadalafil (CIALIS) 5 MG tablet Take 1 tablet by mouth as needed for Erectile Dysfunction 5/3/21   Keshia Loosen, DO       Allergies:  Patient has no known allergies. Social History:  The patient currently lives home    TOBACCO:   reports that he has been smoking cigarettes. He has a 15.00 pack-year smoking history. He has never used smokeless tobacco.  ETOH:   reports previous alcohol use. Family History:  Reviewed in detail and negative for DM, Early CAD, Cancer, CVA. Positive as follows:    History reviewed. No pertinent family history.     REVIEW OF SYSTEMS: as noted in the HPI. All other systems reviewed and negative. PHYSICAL EXAM:    /80   Pulse 76   Temp 98.7 °F (37.1 °C)   Resp 16   Ht 6' 5\" (1.956 m)   Wt 182 lb 8.7 oz (82.8 kg)   SpO2 95%   BMI 21.65 kg/m²     General appearance: Fatigued appearing, alert and oriented x4, no acute respiratory distress  HEENT Normal cephalic, atraumatic without obvious deformity. Pupils equal, round, and reactive to light. Extra ocular muscles intact. Conjunctivae/corneas clear. Dry mucous membranes  Neck: Supple, no JVD  Lungs: Diminished breath sounds but clear bilaterally  Heart: Regular rate and rhythm with Normal S1/S2 without murmurs, rubs or gallops, point of maximum impulse non-displaced  Abdomen: Soft, nontender, nondistended, active bowel sounds  Extremities: Trace bilateral lower extremity nonpitting edema  Skin: Noted area of the tunnel dialysis catheter of the right subclavian entrance point, there is a demonstrated welts that seems to have some slight purulent drainage but there is some erythema around the catheter site as well as some warmth, no drainage or purulence is noted from the catheter itself. Neurologic: Grossly intact neurologic  Mental status: Alert, oriented, thought content appropriate. Capillary Refill: Acceptable  < 3 seconds  Peripheral Pulses: +3 Easily felt, not easily obliterated with pressure    Chest x-ray: Pulmonary emphysema      CBC   Recent Labs     10/05/21  1648 10/06/21  0636   WBC 6.5 7.6   HGB 12.0* 11.1*   HCT 36.9* 34.5*    154      RENAL  Recent Labs     10/05/21  1648 10/06/21  0636    132*   K 3.9 3.8    100   CO2 21 18*   BUN 52* 61*   CREATININE 7.7* 7.0*     LFT'S  Recent Labs     10/05/21  1648   AST 9*   ALT 7*   BILITOT <0.2   ALKPHOS 61     COAG  No results for input(s): INR in the last 72 hours. CARDIAC ENZYMES  No results for input(s): CKTOTAL, CKMB, CKMBINDEX, TROPONINI in the last 72 hours.     U/A:    Lab Results   Component Value Date    COLORU YELLOW 05/10/2021    WBCUA 6 05/10/2021    RBCUA 7 05/10/2021    BACTERIA 1+ 03/21/2021    CLARITYU Clear 05/10/2021    SPECGRAV 1.012 05/10/2021    LEUKOCYTESUR Negative 05/10/2021    BLOODU SMALL 05/10/2021    GLUCOSEU Negative 05/10/2021       ABG  No results found for: OHJ7LAS, BEART, W5FRAPJZ, PHART, THGBART, EVD7YMI, PO2ART, IHS7IVG        Active Hospital Problems    Diagnosis Date Noted    Infection of venous access port [T80.219A] 10/06/2021     Priority: High    Catheter-related bloodstream infection [T80.211A] 10/05/2021     Priority: High    UTI (urinary tract infection) [N39.0] 10/05/2021     Priority: High    ESRD (end stage renal disease) ([facility] [address] [N18.6] 10/05/2021    Polycystic kidney [Q61.3] 01/18/2021         PHYSICIANS CERTIFICATION:    I certify that Zaida Craig is expected to be hospitalized for greater than 2 midnights based on the following assessment and plan:      ASSESSMENT/PLAN:  · Infection of venous access port  · UTI  · ESRD  · Polycystic    Plan:  · ED physician was able to obtain sample of the drainage from the site, sent for culture, blood cultures pending  · Start patient on vancomycin and cefepime and patient also received clindamycin in the ED  · Due to the potential for urinary tract infection, no urine sample was obtained yet, but patient having symptoms and so we will treat as indicated  · Repeat labs in the morning  · Infect disease consult for UTI and tunnel catheter infection  · Nephrology consult for ESRD    DVT Prophylaxis: Heparin  Diet: ADULT DIET; Regular; Low Sodium (2 gm); Low Potassium (Less than 3000 mg/day)  Code Status: Full Code  PT/OT Eval Status: Ambulatory    Dispo -pending clinical course       Nikunj Cristobal DO    Thank you Lila Means DO for the opportunity to be involved in this patient's care. If you have any questions or concerns please feel free to contact me at [phone].

## 2021-10-06 PROBLEM — T80.219A INFECTION OF VENOUS ACCESS PORT: Status: ACTIVE | Noted: 2021-10-06

## 2021-10-06 LAB
ANION GAP SERPL CALCULATED.3IONS-SCNC: 14 MMOL/L (ref 3–16)
BASOPHILS ABSOLUTE: 0 K/UL (ref 0–0.2)
BASOPHILS RELATIVE PERCENT: 0.5 %
BILIRUBIN URINE: NEGATIVE
BLOOD, URINE: ABNORMAL
BUN BLDV-MCNC: 61 MG/DL (ref 7–20)
CALCIUM SERPL-MCNC: 8.6 MG/DL (ref 8.3–10.6)
CHLORIDE BLD-SCNC: 100 MMOL/L (ref 99–110)
CLARITY: CLEAR
CO2: 18 MMOL/L (ref 21–32)
COLOR: YELLOW
CREAT SERPL-MCNC: 7 MG/DL (ref 0.9–1.3)
EKG ATRIAL RATE: 81 BPM
EKG DIAGNOSIS: NORMAL
EKG P AXIS: 20 DEGREES
EKG P-R INTERVAL: 144 MS
EKG Q-T INTERVAL: 392 MS
EKG QRS DURATION: 98 MS
EKG QTC CALCULATION (BAZETT): 455 MS
EKG R AXIS: 6 DEGREES
EKG T AXIS: 57 DEGREES
EKG VENTRICULAR RATE: 81 BPM
EOSINOPHILS ABSOLUTE: 0.3 K/UL (ref 0–0.6)
EOSINOPHILS RELATIVE PERCENT: 4.1 %
EPITHELIAL CELLS, UA: 0 /HPF (ref 0–5)
GFR AFRICAN AMERICAN: 11
GFR NON-AFRICAN AMERICAN: 9
GLUCOSE BLD-MCNC: 112 MG/DL (ref 70–99)
GLUCOSE URINE: NEGATIVE MG/DL
HCT VFR BLD CALC: 34.5 % (ref 40.5–52.5)
HEMOGLOBIN: 11.1 G/DL (ref 13.5–17.5)
HYALINE CASTS: 0 /LPF (ref 0–8)
KETONES, URINE: NEGATIVE MG/DL
LACTIC ACID, SEPSIS: 1.7 MMOL/L (ref 0.4–1.9)
LACTIC ACID, SEPSIS: 1.7 MMOL/L (ref 0.4–1.9)
LEUKOCYTE ESTERASE, URINE: NEGATIVE
LYMPHOCYTES ABSOLUTE: 1.9 K/UL (ref 1–5.1)
LYMPHOCYTES RELATIVE PERCENT: 25.1 %
MAGNESIUM: 2 MG/DL (ref 1.8–2.4)
MCH RBC QN AUTO: 27.3 PG (ref 26–34)
MCHC RBC AUTO-ENTMCNC: 32.3 G/DL (ref 31–36)
MCV RBC AUTO: 84.6 FL (ref 80–100)
MICROSCOPIC EXAMINATION: YES
MONOCYTES ABSOLUTE: 0.6 K/UL (ref 0–1.3)
MONOCYTES RELATIVE PERCENT: 8.5 %
NEUTROPHILS ABSOLUTE: 4.7 K/UL (ref 1.7–7.7)
NEUTROPHILS RELATIVE PERCENT: 61.8 %
NITRITE, URINE: NEGATIVE
PDW BLD-RTO: 14.7 % (ref 12.4–15.4)
PH UA: 7 (ref 5–8)
PLATELET # BLD: 154 K/UL (ref 135–450)
PMV BLD AUTO: 8.8 FL (ref 5–10.5)
POTASSIUM SERPL-SCNC: 3.8 MMOL/L (ref 3.5–5.1)
PROTEIN UA: 100 MG/DL
RBC # BLD: 4.08 M/UL (ref 4.2–5.9)
RBC UA: 6 /HPF (ref 0–4)
SODIUM BLD-SCNC: 132 MMOL/L (ref 136–145)
SPECIFIC GRAVITY UA: 1.01 (ref 1–1.03)
URINE REFLEX TO CULTURE: ABNORMAL
URINE TYPE: ABNORMAL
UROBILINOGEN, URINE: 0.2 E.U./DL
VANCOMYCIN TROUGH: 24.1 UG/ML (ref 10–20)
WBC # BLD: 7.6 K/UL (ref 4–11)
WBC UA: 1 /HPF (ref 0–5)

## 2021-10-06 PROCEDURE — 83735 ASSAY OF MAGNESIUM: CPT

## 2021-10-06 PROCEDURE — 6360000002 HC RX W HCPCS: Performed by: STUDENT IN AN ORGANIZED HEALTH CARE EDUCATION/TRAINING PROGRAM

## 2021-10-06 PROCEDURE — 6370000000 HC RX 637 (ALT 250 FOR IP): Performed by: INTERNAL MEDICINE

## 2021-10-06 PROCEDURE — 93010 ELECTROCARDIOGRAM REPORT: CPT | Performed by: INTERNAL MEDICINE

## 2021-10-06 PROCEDURE — 2580000003 HC RX 258: Performed by: STUDENT IN AN ORGANIZED HEALTH CARE EDUCATION/TRAINING PROGRAM

## 2021-10-06 PROCEDURE — 83605 ASSAY OF LACTIC ACID: CPT

## 2021-10-06 PROCEDURE — 90935 HEMODIALYSIS ONE EVALUATION: CPT

## 2021-10-06 PROCEDURE — 87340 HEPATITIS B SURFACE AG IA: CPT

## 2021-10-06 PROCEDURE — 86706 HEP B SURFACE ANTIBODY: CPT

## 2021-10-06 PROCEDURE — 80202 ASSAY OF VANCOMYCIN: CPT

## 2021-10-06 PROCEDURE — 86803 HEPATITIS C AB TEST: CPT

## 2021-10-06 PROCEDURE — 86701 HIV-1ANTIBODY: CPT

## 2021-10-06 PROCEDURE — 87390 HIV-1 AG IA: CPT

## 2021-10-06 PROCEDURE — 80048 BASIC METABOLIC PNL TOTAL CA: CPT

## 2021-10-06 PROCEDURE — 86702 HIV-2 ANTIBODY: CPT

## 2021-10-06 PROCEDURE — 81001 URINALYSIS AUTO W/SCOPE: CPT

## 2021-10-06 PROCEDURE — 36415 COLL VENOUS BLD VENIPUNCTURE: CPT

## 2021-10-06 PROCEDURE — 94760 N-INVAS EAR/PLS OXIMETRY 1: CPT

## 2021-10-06 PROCEDURE — 5A1D70Z PERFORMANCE OF URINARY FILTRATION, INTERMITTENT, LESS THAN 6 HOURS PER DAY: ICD-10-PCS | Performed by: STUDENT IN AN ORGANIZED HEALTH CARE EDUCATION/TRAINING PROGRAM

## 2021-10-06 PROCEDURE — 99223 1ST HOSP IP/OBS HIGH 75: CPT | Performed by: INTERNAL MEDICINE

## 2021-10-06 PROCEDURE — 85025 COMPLETE CBC W/AUTO DIFF WBC: CPT

## 2021-10-06 PROCEDURE — 02HV33Z INSERTION OF INFUSION DEVICE INTO SUPERIOR VENA CAVA, PERCUTANEOUS APPROACH: ICD-10-PCS | Performed by: INTERNAL MEDICINE

## 2021-10-06 PROCEDURE — 6370000000 HC RX 637 (ALT 250 FOR IP): Performed by: STUDENT IN AN ORGANIZED HEALTH CARE EDUCATION/TRAINING PROGRAM

## 2021-10-06 PROCEDURE — 1200000000 HC SEMI PRIVATE

## 2021-10-06 RX ORDER — CALCIUM ACETATE 667 MG/1
667 CAPSULE ORAL
Status: DISCONTINUED | OUTPATIENT
Start: 2021-10-06 | End: 2021-10-06

## 2021-10-06 RX ORDER — ALBUTEROL SULFATE 90 UG/1
2 AEROSOL, METERED RESPIRATORY (INHALATION) EVERY 6 HOURS PRN
Status: DISCONTINUED | OUTPATIENT
Start: 2021-10-06 | End: 2021-10-07 | Stop reason: HOSPADM

## 2021-10-06 RX ORDER — BUPRENORPHINE AND NALOXONE 8; 2 MG/1; MG/1
1.5 FILM, SOLUBLE BUCCAL; SUBLINGUAL DAILY
Status: DISCONTINUED | OUTPATIENT
Start: 2021-10-06 | End: 2021-10-07 | Stop reason: HOSPADM

## 2021-10-06 RX ORDER — HEPARIN SODIUM 1000 [USP'U]/ML
3800 INJECTION, SOLUTION INTRAVENOUS; SUBCUTANEOUS PRN
Status: DISCONTINUED | OUTPATIENT
Start: 2021-10-06 | End: 2021-10-07 | Stop reason: HOSPADM

## 2021-10-06 RX ADMIN — TRAZODONE HYDROCHLORIDE 50 MG: 50 TABLET ORAL at 00:04

## 2021-10-06 RX ADMIN — FERROUS SULFATE TAB EC 324 MG (65 MG FE EQUIVALENT) 324 MG: 324 (65 FE) TABLET DELAYED RESPONSE at 09:13

## 2021-10-06 RX ADMIN — SODIUM CHLORIDE 25 ML: 9 INJECTION, SOLUTION INTRAVENOUS at 21:17

## 2021-10-06 RX ADMIN — ACETAMINOPHEN 650 MG: 325 TABLET ORAL at 21:19

## 2021-10-06 RX ADMIN — PANTOPRAZOLE SODIUM 40 MG: 40 TABLET, DELAYED RELEASE ORAL at 09:13

## 2021-10-06 RX ADMIN — AMLODIPINE BESYLATE 5 MG: 5 TABLET ORAL at 00:04

## 2021-10-06 RX ADMIN — BUPRENORPHINE AND NALOXONE 1.5 FILM: 8; 2 FILM BUCCAL; SUBLINGUAL at 10:17

## 2021-10-06 RX ADMIN — SODIUM CHLORIDE, PRESERVATIVE FREE 10 ML: 5 INJECTION INTRAVENOUS at 00:04

## 2021-10-06 RX ADMIN — TRAZODONE HYDROCHLORIDE 50 MG: 50 TABLET ORAL at 21:19

## 2021-10-06 RX ADMIN — CEFEPIME HYDROCHLORIDE 1000 MG: 1 INJECTION, POWDER, FOR SOLUTION INTRAMUSCULAR; INTRAVENOUS at 09:14

## 2021-10-06 RX ADMIN — AMLODIPINE BESYLATE 5 MG: 5 TABLET ORAL at 21:19

## 2021-10-06 RX ADMIN — SODIUM CHLORIDE, PRESERVATIVE FREE 10 ML: 5 INJECTION INTRAVENOUS at 09:13

## 2021-10-06 RX ADMIN — CEFEPIME HYDROCHLORIDE 1000 MG: 1 INJECTION, POWDER, FOR SOLUTION INTRAMUSCULAR; INTRAVENOUS at 21:18

## 2021-10-06 RX ADMIN — SODIUM CHLORIDE, PRESERVATIVE FREE 10 ML: 5 INJECTION INTRAVENOUS at 21:19

## 2021-10-06 ASSESSMENT — PAIN DESCRIPTION - PAIN TYPE: TYPE: ACUTE PAIN

## 2021-10-06 ASSESSMENT — PAIN SCALES - GENERAL
PAINLEVEL_OUTOF10: 0
PAINLEVEL_OUTOF10: 3
PAINLEVEL_OUTOF10: 0

## 2021-10-06 ASSESSMENT — PAIN DESCRIPTION - LOCATION: LOCATION: GENERALIZED

## 2021-10-06 NOTE — CARE COORDINATION
INITIAL CASE MANAGEMENT ASSESSMENT    Reviewed chart, met with patient to assess possible discharge needs. Explained Case Management role/services. Living Situation: confirmed address, patient reports he lives with his mom in home    ADLs: independent     DME: none reported    PT/OT Recs: n/a     Active Services: none reported     Transportation: active , patient reports family to transport home at discharge     Medications: Pharmacy: [facility] in [facility], no issues    PCP: Perry Hamilton, DO      HD/PD: Marcus Palomo (ph: [phone], fax: [phone]), TTS 11:15 am, called to confirm chair time    PLAN/COMMENTS: patient plans to return home, call Sarah Swenson and fax information at discharge    SW/CM provided contact information for patient or family to call with any questions. SW/CM will follow and assist as needed.     Lottie HOPPER, LISW-S, Social Work  [phone]    Electronically signed by WARD Blanco, GRECIA on 10/6/2021 at 11:13 AM

## 2021-10-06 NOTE — PROGRESS NOTES
Hospitalist Progress Note      PCP: Sienna Vega DO    Date of Admission: 10/5/2021    SUBJECTIVE: c/o dysuria with urination/ejactulation; last IJ heroin RFA; no events overnight              OBJECTIVE:     Vitals    TEMPERATURE:  Current - Temp: 98.9 °F (37.2 °C); Max - Temp  Av.3 °F (36.8 °C)  Min: 97.8 °F (36.6 °C)  Max: 98.9 °F (37.2 °C)  RESPIRATIONS RANGE: Resp  Av.1  Min: 16  Max: 18  PULSE RANGE: Pulse  Av.7  Min: 64  Max: 84  BLOOD PRESSURE RANGE:  Systolic (66FSU), SPM:342 , Min:130 , KZN:374   ; Diastolic (57FHB), GWH:92, Min:48, Max:99    PULSE OXIMETRY RANGE: SpO2  Av.2 %  Min: 95 %  Max: 100 %  24HR INTAKE/OUTPUT:    Intake/Output Summary (Last 24 hours) at 10/6/2021 1744  Last data filed at 10/6/2021 1655  Gross per 24 hour   Intake 2530 ml   Output 1300 ml   Net 1230 ml       Exam:    General appearance: Well, no apparent distress, appears stated age and cooperative. HEENT Normal cephalic, atraumatic without obvious deformity. Pupils equal, round, and reactive to light. Extra ocular muscles intact. Conjunctivae/corneas clear. Neck: Supple, No jugular venous distention/bruits. Trachea midline without thyromegaly or adenopathy with full range of motion. Lungs: Clear to ascultation, bilaterally without Rales/Wheezes/Rhonchi with good respiratory effort. Heart: R chest superficial abscess, superior to h/d cath; Regular rate and rhythm with Normal S1/S2 without  murmurs, rubs or gallops, point of maximum impulse non-displaced  Abdomen: Soft, non-tender or non-distended without rigidity or guarding and positive bowel sounds all four quadrants. Extremities: No clubbing, cyanosis, or edema bilaterally. Full range of motion without deformity and normal gait intact. Skin: Skin color, texture, turgor normal. No rashes or lesions. Neurologic: Alert and oriented X 3,  neurovascularly intact with sensory/motor intact upper extremities/lower extremities, bilaterally.  Cranial nerves:II-XII intact, grossly non-focal.  Mental status: Alert, oriented, thought content appropriate.         ASSESSMENT AND PLAN    Possible h/d cath infection vs superficial cellulitis - Cx NTD; empiric Cefepime/Vanco; ID consulted    ESRD possibly d/t PCKD - noncompliant with h/d;  No electrolyte abnormalities; renal following     Dysuria with urination/ejaculation - doubt prostatitis; U/a unremarkable; defer STI w/u to ID    Possible Emphysema - noted on CXR; recommend PFTs as outpatient; albuterol PRN    Polysubstance abuse - Heroin IJ; 5 days sober per Pt; Suboxone Rx confirmed; check UDS    Essential HTN - continue home Rx    H/o LFA abscess, possible MRSA per Pt     DVT Prophylaxis: Heparin            Joni Segundo, DO

## 2021-10-06 NOTE — PROGRESS NOTES
Medication Reconciliation     List of medications patient is currently taking is complete. Source of information:   1. Conversation with patient at bedside  2. EPIC records        Notes regarding home medications:  1. Patient only receive his Suboxone 8-2mg 1.5 films today PTA. Denies any other OTC/herbal medications.      Laverne Fried, Pharmacy Intern

## 2021-10-06 NOTE — CONSULTS
Infectious Diseases Inpatient Consult Note      Reason for Consult:  HD line site local swelling and infection     Requesting Physician:       Primary Care Physician:  Laverne Anand DO    History Obtained From:  Epic and patient     CHIEF COMPLAINT:     Chief Complaint   Patient presents with    Other     Pt reports wound infection that he \"first noticed this morning\". Pt states that he has \"kidney disease and is on dialysis\". Pt states that he has \"missed dialysis for a month\". Pt alert and oriented and denies any pain at this time,    Wound Infection         HISTORY OF PRESENT ILLNESS:  32 y.o. Man with IVDA. Hep C+ ESRD on HD admitted with HD line site swelling and local skin abscess has notice a lump away from the Exit site with drainage, concerned about infection presented to ED for evaluation. Per HPI last use of IV Heroin x 5 days ago. Blood cx in process and wound cx in process was placed on IV abx we are consulted for recommendations. No fevers recorded here in hospital. He had Left AV graft excision on 7/5/21 at [facility] per records. IR tunneled line placed at [facility] on 79/21. Past Medical History:    Past Medical History:   Diagnosis Date    Chronic kidney disease     Hematuria     Hepatitis C     Hypertension     Kidney failure     Overdose     Polycystic kidney        Past Surgical History:    History reviewed. No pertinent surgical history. Current Medications:    Outpatient Medications Marked as Taking for the 10/5/21 encounter [facility] Encounter)   Medication Sig Dispense Refill    buprenorphine-naloxone (SUBOXONE) 8-2 MG FILM SL film Place 1.5 Film under the tongue daily.  acetaminophen (TYLENOL) 325 MG tablet Take 650 mg by mouth every 6 hours as needed for Pain      vitamin D (CHOLECALCIFEROL) 76208 UNIT CAPS Take 2,000 Units by mouth daily      gabapentin (NEURONTIN) 100 MG capsule Take 1 capsule by mouth nightly for 30 days.  Intended supply: 90 days 30 capsule 1    traZODone (DESYREL) 50 MG tablet Take 1 tablet by mouth nightly as needed for Sleep 90 tablet 1    pantoprazole (PROTONIX) 40 MG tablet Take 1 tablet by mouth daily 60 tablet 2    ondansetron (ZOFRAN ODT) 4 MG disintegrating tablet Take 1 tablet by mouth every 8 hours as needed for Nausea 20 tablet 0    lisinopril (PRINIVIL;ZESTRIL) 5 MG tablet Take 5 mg by mouth daily       vitamin E 400 UNIT capsule Take 400 Units by mouth nightly      Calcium Acetate, Phos Binder, 667 MG CAPS Take 667 mg by mouth 3 times daily (with meals)      ferrous sulfate (IRON 325) 325 (65 Fe) MG tablet Take 325 mg by mouth daily (with breakfast)      amLODIPine (NORVASC) 5 MG tablet Take 5 mg by mouth 2 times daily          Allergies:  Patient has no known allergies.     Immunizations :   Immunization History   Administered Date(s) Administered    Hepatitis A/Hepatitis B (T[employer]) 06/15/2020    Influenza Virus Vaccine 09/28/2020         Social History:    Social History     Tobacco Use    Smoking status: Current Every Day Smoker     Packs/day: 1.00     Years: 15.00     Pack years: 15.00     Types: Cigarettes    Smokeless tobacco: Never Used   Vaping Use    Vaping Use: Never used   Substance Use Topics    Alcohol use: Not Currently    Drug use: Not Currently     Comment: Hx of Overdose in Care Everywhere     Social History     Tobacco Use   Smoking Status Current Every Day Smoker    Packs/day: 1.00    Years: 15.00    Pack years: 15.00    Types: Cigarettes   Smokeless Tobacco Never Used      Family History : no dvt NO copd        REVIEW OF SYSTEMS:    Constitutional:  negative for fevers, chills, night sweats  Eyes:  negative for blurred vision, eye discharge, visual disturbance   HEENT:  negative for hearing loss, ear drainage,nasal congestion  Respiratory:  negative for cough, shortness of breath or hemoptysis   Cardiovascular:  negative for chest pain, palpitations, syncope  Gastrointestinal:  negative for nausea, vomiting, diarrhea, constipation, abdominal pain  Genitourinary:  negative for frequency, dysuria, urinary incontinence, hematuria  Hematologic/Lymphatic:  negative for easy bruising, bleeding and lymphadenopathy  Allergic/Immunologic:  negative for recurrent infections, angioedema, anaphylaxis   Endocrine:  negative for weight changes, polyuria, polydipsia and polyphagia  Musculoskeletal:  negative for joint  pain, swelling, decreased range of motion  Integumentary: No rashes, skin lesions  Neurological:  negative for headaches, slurred speech, unilateral weakness  Psychiatric: negative for hallucinations,confusion,agitation.      PHYSICAL EXAM:      Vitals:    BP (!) 144/48   Pulse 64   Temp 98.1 °F (36.7 °C)   Resp 18   Ht 6' 5\" (1.956 m)   Wt 182 lb 8.7 oz (82.8 kg)   SpO2 96%   BMI 21.65 kg/m²     General Appearance: alert,in no acute distress, no pallor, no icterus skin changes from ESRD+   Skin: warm and dry, no rash or erythema  Head: normocephalic and atraumatic  Eyes: pupils equal, round, and reactive to light, conjunctivae normal  ENT: tympanic membrane, external ear and ear canal normal bilaterally, nose without deformity, nasal mucosa and turbinates normal without polyps  Neck: supple and non-tender without mass, no thyromegaly  no cervical lymphadenopathy  Pulmonary/Chest: clear to auscultation bilaterally- no wheezes, rales or rhonchi, normal air movement, no respiratory distress  Cardiovascular: normal rate, regular rhythm, normal S1 and S2, no murmurs, rubs, clicks, or gallops, no carotid bruits  Abdomen: soft, non-tender, non-distended, normal bowel sounds, no masses or organomegaly  Extremities: no cyanosis, clubbing or edema  Musculoskeletal: normal range of motion, no joint swelling, deformity or tenderness  Integumentary: No rashes, no abnormal skin lesions, no petechiae  Neurologic: reflexes normal and symmetric, no cranial nerve deficit  Psych:  Orientation, sensorium, mood normal   Lines: iv  HD line Rt chest wall away from the site infraclavicular area swelling+ tender skin abscess?      DATA:    CBC:   Lab Results   Component Value Date    WBC 7.6 10/06/2021    HGB 11.1 (L) 10/06/2021    HCT 34.5 (L) 10/06/2021    MCV 84.6 10/06/2021     10/06/2021     RENAL:   Lab Results   Component Value Date    CREATININE 7.0 (HH) 10/06/2021    BUN 61 (H) 10/06/2021     (L) 10/06/2021    K 3.8 10/06/2021     10/06/2021    CO2 18 (L) 10/06/2021     SED RATE:   Lab Results   Component Value Date    SEDRATE 15 10/05/2021     CK: No results found for: CKTOTAL  CRP:   Lab Results   Component Value Date    CRP <3.0 10/05/2021     Hepatic Function Panel:   Lab Results   Component Value Date    ALKPHOS 61 10/05/2021    ALT 7 10/05/2021    AST 9 10/05/2021    PROT 7.4 10/05/2021    BILITOT <0.2 10/05/2021    LABALBU 4.2 10/05/2021     UA:  Lab Results   Component Value Date    COLORU YELLOW 10/06/2021    CLARITYU Clear 10/06/2021    GLUCOSEU Negative 10/06/2021    BILIRUBINUR Negative 10/06/2021    KETUA Negative 10/06/2021    SPECGRAV 1.009 10/06/2021    BLOODU SMALL 10/06/2021    PHUR 7.0 10/06/2021    PROTEINU 100 10/06/2021    UROBILINOGEN 0.2 10/06/2021    NITRU Negative 10/06/2021    LEUKOCYTESUR Negative 10/06/2021    LABMICR YES 10/06/2021    URINETYPE not givn 10/06/2021      Urine Microscopic:   Lab Results   Component Value Date    BACTERIA 1+ 03/21/2021    HYALCAST 0 10/06/2021    WBCUA 1 10/06/2021    RBCUA 6 10/06/2021    EPIU 0 10/06/2021     Urine Reflex to Culture:   Lab Results   Component Value Date    URRFLXCULT Not Indicated 10/06/2021         MICRO: cultures reviewed and updated by me   Procedure Component Value Units Date/Time   Culture, Wound [[id number]] Collected: 10/05/21 1800   Order Status: Completed Specimen: Chest Updated: 10/06/21 1044    WOUND/ABSCESS No growth to date   Narrative:     ORDER#: [id number]                          ORDERED BY: Nikki Huston SOURCE: Chest                              COLLECTED:  10/05/21 18:00   ANTIBIOTICS AT [facility]. :                      RECEIVED :  10/05/21 18:17   Performed at:   [facility]   [address] [facility] [address]   Phone [phone]   Culture, Blood 2 [[id number]] Collected: 10/05/21 1648   Order Status: Sent Specimen: Blood Updated: 10/05/21 1809   Culture, Blood 1 [[id number]] Collected: 10/05/21 1648   Order Status: Sent Specimen: Blood Updated: 10/05/21 1717     Results for Merl Constable (MRN [id number]) as of 10/6/2021 13:21   Ref. Range 5/10/2021 16:08   Hep A IgM Latest Ref Range: Non-reactive  Non-reactive   Hep A Total Ab Latest Ref Range: Negative  Positive (A)   Hep B S Ab Latest Units: mIU/mL >1000.00   Hep B S Ag Interp Latest Ref Range: Non-reactive  Non-reactive   Hep C Ab Interp Latest Ref Range: Non-reactive  REACTIVE (A)   Hep B Core Ab, IgM Latest Ref Range: Non-reactive  Non-reactive   Hep B Core Total Ab Latest Ref Range: Negative  Negative   Hepatitis C Genotype Unknown Indeterminate       Blood Culture: No results found for: BC, BLOODCULT2    Viral Culture:    No results found for: COVID19  Urine Culture: No results for input(s): LABURIN in the last 72 hours.     Scheduled Meds:   cefepime  1,000 mg IntraVENous Q12H    vancomycin (VANCOCIN) intermittent dosing (placeholder)   Other RX Placeholder    buprenorphine-naloxone  1.5 Film SubLINGual Daily    lisinopril  5 mg Oral Daily    pantoprazole  40 mg Oral Daily    amLODIPine  5 mg Oral BID    sodium chloride flush  5-40 mL IntraVENous 2 times per day    heparin (porcine)  5,000 Units SubCUTAneous 3 times per day       Continuous Infusions:   sodium chloride         PRN Meds:  traZODone, sodium chloride flush, sodium chloride, acetaminophen **OR** acetaminophen    Imaging:   XR CHEST 1 VIEW   Final Result   Pulmonary emphysema with no acute process             All pertinent images and reports for the current Hospitalization were reviewed by me. IMPRESSION:    Patient Active Problem List   Diagnosis    ESRD (end stage renal disease) [facility] dialysis ([facility] [address]    Polycystic kidney    Hep C w/o coma, chronic (HCC)    Fistula of artery (HCC)    Moderate episode of recurrent major depressive disorder (HCC)    Opioid dependence in remission ([facility] [address]    Catheter-related bloodstream infection    ESRD (end stage renal disease) ([facility] [address]    UTI (urinary tract infection)    Infection of venous access port     ESRD on HD  Rt HD line [facility] at [facility]  Now with Catheter site swelling and local skin abscess  IVDA  On going   Left AV graft excision in July at [place] [address] C+  Polycystic Kidney disease  CXR with Pulmonary emphysema     There is some swelling with local skin abscess above the HD line over the Chest and no drainage from the exit site Blood cx in process           Labs, Microbiology, Radiology and pertinent results from current hospitalization and care every where were reviewed by me as a part of the consultation. PLAN :  1. Cont IV Vancomycin by level  2. Cont IV Cefepime   3. Blood cx in process  4. Wound cx in process  5. UA negative   6. Await cx to make decision in regards to the line if blood cx positive will ask IR to remove the line     Discussed with patient/Family and Nursing   Risk of Complications/Morbidity: High      · Illness(es)/ Infection present that pose threat to bodily function. · There is potential for severe exacerbation of infection/side effects of treatment. · Therapy requires intensive monitoring for antimicrobial agent toxicity. Thanks for allowing me to participate in your patient's care please call me with any questions or concerns.     Dr. Melanie Bach MD  [address] [facility] Physician  Phone: [phone]   Fax : [phone]

## 2021-10-06 NOTE — CONSULTS
[facility]rology ([facility]) Consult Note    Juma Louie is a 32 y.o. male whom we were asked to see for ESRD management. He presents with a lesion on the top of the tunnel of his dialysis catheter. It started the day before yesterday. He reports having fevers at home. He has not been to dialysis x 3 weeks. He reports that he is quitting drugs on his own. Past Medical History:  ESRD on HD MWF  PKD  hep C  HTN    Review of System:  Otherwise unremarkable    Allergies:  Patient has no known allergies. Medications:  Current medications reviewed. Social History:  tobacco abuse  Family Medical History:  Negative for kidney disase    Physical Exam:  Blood pressure (!) 144/48, pulse 64, temperature 98.1 °F (36.7 °C), resp. rate 18, height 6' 5\" (1.956 m), weight 182 lb 8.7 oz (82.8 kg), SpO2 96 %. General:  NAD, A+Ox3, ill-appearing, normal body habitus  HEENT:  PERRL, EOMI  Neck:  Supple, normal range of movement, thyroid unremarkable  Chest:  CTAB, good respiratory effort, good air movement  CV:  RRR, no murmurs or rubs, no carotid bruit, no abdominal bruits  Abdomen:  NTND, soft, +BS, no hepatosplenomegaly  Extremities:  No peripheral edema  Neurological:  Moving all four extremities, CN II-XII grossly intact  Lymphatics:  No palpable lymph nodes  Skin:  No rash, no jaundice.   small lesion on the top of his dialysis catheter's tunnel with no active drainage currently  Psychiatric:  poor insight and judgement, poor recall    Laboratory Studies:  Lab Results   Component Value Date/Time     (L) 10/06/2021 06:36 AM    K 3.8 10/06/2021 06:36 AM    K 4.6 04/06/2021 01:12 AM     10/06/2021 06:36 AM    CO2 18 (L) 10/06/2021 06:36 AM    BUN 61 (H) 10/06/2021 06:36 AM    CREATININE 7.0 (HH) 10/06/2021 06:36 AM    CALCIUM 8.6 10/06/2021 06:36 AM    PHOS 4.0 05/10/2021 04:08 PM    MG 2.00 10/06/2021 06:36 AM     Lab Results   Component Value Date/Time    WBC 7.6 10/06/2021 06:36 AM HGB 11.1 (L) 10/06/2021 06:36 AM    HCT 34.5 (L) 10/06/2021 06:36 AM     10/06/2021 06:36 AM       Assessment/Plan:  Reviewed old records and labs. 1) ESRD   - he has been very non-compliant with dialysis treatments.    - will treat him as an initiation as he has not had dialysis x 3 weeks    2) ID   - cultures pending   - on cefepime and vancomycin   - awaiting culture results before deciding what to do about TDC   - ID consulted   - check HIV    3) drug abuse   - needs rehab    4) non-compliance   - poor prognosis    5) tobacco abuse   - needs to quit smoking

## 2021-10-06 NOTE — CONSULTS
Clinical Pharmacy Note  Vancomycin Consult    Vince Ye is a 32 y.o. male ordered Vancomycin for sepsis; consult received from Dr. Susanne Cerna to manage therapy. Also receiving cefepime. Patient Active Problem List   Diagnosis    [facility] renal [facility]) [facility]    Polycystic kidney    Hep C w/o coma, chronic (HCC)    Fistula of artery (HCC)    Moderate episode of recurrent major depressive disorder ([facility]    Opioid dependence in remission ([facility]    Catheter-related bloodstream infection    ESRD (end stage renal disease) ([facility] [facility]    UTI (urinary tract infection)    Infection of venous access port       Allergies:  Patient has no known allergies. Temp max:  Temp (24hrs), Av.4 °F (36.9 °C), Min:98.1 °F (36.7 °C), Max:98.7 °F (37.1 °C)      Recent Labs     10/05/21  1648 10/06/21  0636   WBC 6.5 7.6       Recent Labs     10/05/21  1648 10/06/21  0636   BUN 52* 61*   CREATININE 7.7* 7.0*         Intake/Output Summary (Last 24 hours) at 10/6/2021 1004  Last data filed at 10/6/2021 0912  Gross per 24 hour   Intake 1530 ml   Output    Net 1530 ml       Culture Results:  Pending    Ht Readings from Last 1 Encounters:   10/05/21 6' 5\" (1.956 m)        Wt Readings from Last 1 Encounters:   10/06/21 182 lb 8.7 oz (82.8 kg)         Estimated Creatinine Clearance: 18 mL/min (A) (based on SCr of 7 mg/dL (HH)). Assessment/Plan:  Patient received vancomycin 1250 mg IV x 1 in the ED at 2100. Random level 24.1 ug/mL. Will await plans for HD; pt does not require a dose today. Thank you for the consult.      Ermelinda Fitzgerald, [address] [place] [address]  10/6/2021 10:04 AM

## 2021-10-06 NOTE — CONSULTS
Clinical Pharmacy Note  Vancomycin Consult    Sonido Steel is a 32 y.o. male ordered Vancomycin for sepsis; consult received from Dr. Siddhartha Briggs to manage therapy. Also receiving cefepime. Patient Active Problem List   Diagnosis    ESRD [facility]end stage renal disease) [facility] dialysis ([facility] [address] [facility]    Polycystic kidney    Hep C w/o coma, chronic (HCC)    Fistula of artery (HCC)    Moderate episode of recurrent major depressive disorder ([facility] [address] [facility]    Opioid dependence in remission ([facility] [address] [facility]    Catheter-related bloodstream infection    ESRD (end stage renal disease) ([facility] [address] [facility]    UTI (urinary tract infection)       Allergies:  Patient has no known allergies. Temp max:  Temp (24hrs), Av.4 °F (36.9 °C), Min:98.2 °F (36.8 °C), Max:98.5 °F (36.9 °C)      Recent Labs     10/05/21  1648   WBC 6.5       Recent Labs     10/05/21  1648   BUN 52*   CREATININE 7.7*         Intake/Output Summary (Last 24 hours) at 10/6/2021 0105  Last data filed at 10/5/2021 1921  Gross per 24 hour   Intake 550 ml   Output    Net 550 ml       Culture Results:  Pending    Ht Readings from Last 1 Encounters:   10/05/21 6' 5\" (1.956 m)        Wt Readings from Last 1 Encounters:   10/05/21 182 lb 8.7 oz (82.8 kg)         Estimated Creatinine Clearance: 16 mL/min (A) (based on SCr of 7.7 mg/dL [facility])). Assessment/Plan:  Patient received vancomycin 1250 mg IV x 1 in the ED. Given patient's ESRD history will dose patient based on levels, therefore random level ordered for noon on 10/6. Thank you for the consult.      CATRACHITA Gleason [facility]  10/6/2021 1:07 AM

## 2021-10-06 NOTE — PLAN OF CARE
Problem: Urinary Elimination:  Goal: Signs and symptoms of infection will decrease  Description: Signs and symptoms of infection will decrease  Outcome: Ongoing   Antibiotics as ordered. Monitor labs as ordered. Problem: Infection - Central Venous Catheter-Associated Bloodstream Infection:  Goal: Will show no infection signs and symptoms  Description: Will show no infection signs and symptoms  Outcome: Ongoing   Antibiotics as ordered. Keep dressing clean and dry. Problem: Tissue Perfusion - Renal, Altered:  Goal: Serum creatinine will be within specified parameters  Description: Serum creatinine will be within specified parameters  Outcome: Ongoing   Dialysis as ordered. Monitor labs as ordered.   Problem: Discharge Planning:  Goal: Discharged to appropriate level of care  Description: Discharged to appropriate level of care  Outcome: Ongoing

## 2021-10-06 NOTE — PLAN OF CARE
Problem: Urinary Elimination:  Goal: Signs and symptoms of infection will decrease  Description: Signs and symptoms of infection will decrease  10/6/2021 0752 by Violeta Huynh RN  Outcome: Ongoing     Problem: Infection - Central Venous Catheter-Associated Bloodstream Infection:  Goal: Will show no infection signs and symptoms  Description: Will show no infection signs and symptoms  10/6/2021 0752 by Violeta Huynh RN  Outcome: Ongoing     Problem: Cardiovascular  Goal: No DVT, peripheral vascular complications  88/7/7551 0752 by Violeta Huynh RN  Outcome: Ongoing     Problem: Tissue Perfusion - Renal, Altered:  Goal: Serum creatinine will be within specified parameters  Description: Serum creatinine will be within specified parameters  10/6/2021 0752 by Violeta Huynh RN  Outcome: Ongoing     Problem: Discharge Planning:  Goal: Discharged to appropriate level of care  Description: Discharged to appropriate level of care  10/6/2021 0752 by Violeta Huynh RN  Outcome: Ongoing

## 2021-10-06 NOTE — FLOWSHEET NOTE
10/06/21 1641 10/06/21 1655   Vital Signs   BP (!) 151/83 (!) 151/83   Temp 97.8 °F (36.6 °C) 98 °F (36.7 °C)   Pulse 66 67   Resp 16 18   Weight 187 lb 2.7 oz (84.9 kg) 185 lb 10 oz (84.2 kg)   Weight Method Standing scale Standing scale   Post-Hemodialysis Assessment   NET Removed (ml)  --  500 ml   Treatment time: 2 hours  Net UF: 500 ml    Pre weight: 84.9 kg   Post weight: 84.2 kg  EDW: tbd kg    Access used: RIJ  Access function: good with  ml/min    Medications or blood products given: none    Regular outpatient schedule: [facility] T[facility]    Summary of response to treatment: good-HD tomorrow    Copy of dialysis treatment record placed in chart, to be scanned into EMR.

## 2021-10-06 NOTE — FLOWSHEET NOTE
4 Eyes Skin Assessment     NAME:  Jaya Taylor  YOB: 1990  MEDICAL RECORD NUMBER:  [id number]    The patient is being assess for  Admission    I agree that 2 RN's have performed a thorough Head to Toe Skin Assessment on the patient. ALL assessment sites listed below have been assessed. Areas assessed by both nurses:    Head, Face, Ears, Shoulders, Back, Chest, Arms, Elbows, Hands, Sacrum. Buttock, Coccyx, Ischium and Legs. Feet and Heels        Does the Patient have a Wound? Yes wound(s) were present on assessment.  LDA wound assessment was Initiated and completed        Aftab Prevention initiated:  No   Wound Care Orders initiated:  No    Pressure Injury (Stage 3,4, Unstageable, DTI, NWPT, and Complex wounds) if present place consult order under [de-identified] No    New and Established Ostomies if present place consult order under : No      Nurse 1 eSignature: Electronically signed by Neo Navarro RN on 10/6/2021 at 6:01 AM      Nurse 2 eSignature: Electronically signed by Kaya Perry RN on 10/6/21 at 7:23 AM EDT

## 2021-10-07 VITALS
BODY MASS INDEX: 21.61 KG/M2 | HEART RATE: 66 BPM | WEIGHT: 182.98 LBS | SYSTOLIC BLOOD PRESSURE: 148 MMHG | RESPIRATION RATE: 18 BRPM | OXYGEN SATURATION: 99 % | HEIGHT: 77 IN | DIASTOLIC BLOOD PRESSURE: 79 MMHG | TEMPERATURE: 98 F

## 2021-10-07 LAB
AMPHETAMINE SCREEN, URINE: ABNORMAL
ANION GAP SERPL CALCULATED.3IONS-SCNC: 13 MMOL/L (ref 3–16)
BARBITURATE SCREEN URINE: ABNORMAL
BASOPHILS ABSOLUTE: 0 K/UL (ref 0–0.2)
BASOPHILS RELATIVE PERCENT: 0.8 %
BENZODIAZEPINE SCREEN, URINE: ABNORMAL
BUN BLDV-MCNC: 45 MG/DL (ref 7–20)
CALCIUM SERPL-MCNC: 9.2 MG/DL (ref 8.3–10.6)
CANNABINOID SCREEN URINE: ABNORMAL
CHLORIDE BLD-SCNC: 107 MMOL/L (ref 99–110)
CO2: 19 MMOL/L (ref 21–32)
COCAINE METABOLITE SCREEN URINE: POSITIVE
CREAT SERPL-MCNC: 6 MG/DL (ref 0.9–1.3)
EOSINOPHILS ABSOLUTE: 0.3 K/UL (ref 0–0.6)
EOSINOPHILS RELATIVE PERCENT: 5.5 %
GFR AFRICAN AMERICAN: 13
GFR NON-AFRICAN AMERICAN: 11
GLUCOSE BLD-MCNC: 89 MG/DL (ref 70–99)
GRAM STAIN RESULT: NORMAL
HBV SURFACE AB TITR SER: >1000 MIU/ML
HCT VFR BLD CALC: 37.2 % (ref 40.5–52.5)
HEMOGLOBIN: 12.1 G/DL (ref 13.5–17.5)
HEPATITIS B SURFACE ANTIGEN INTERPRETATION: NORMAL
HEPATITIS C ANTIBODY INTERPRETATION: REACTIVE
HIV AG/AB: NORMAL
HIV ANTIGEN: NORMAL
HIV-1 ANTIBODY: NORMAL
HIV-2 AB: NORMAL
LYMPHOCYTES ABSOLUTE: 1.7 K/UL (ref 1–5.1)
LYMPHOCYTES RELATIVE PERCENT: 29.1 %
Lab: ABNORMAL
MAGNESIUM: 2.1 MG/DL (ref 1.8–2.4)
MCH RBC QN AUTO: 27.2 PG (ref 26–34)
MCHC RBC AUTO-ENTMCNC: 32.5 G/DL (ref 31–36)
MCV RBC AUTO: 83.9 FL (ref 80–100)
METHADONE SCREEN, URINE: ABNORMAL
MONOCYTES ABSOLUTE: 0.6 K/UL (ref 0–1.3)
MONOCYTES RELATIVE PERCENT: 10.1 %
NEUTROPHILS ABSOLUTE: 3.1 K/UL (ref 1.7–7.7)
NEUTROPHILS RELATIVE PERCENT: 54.5 %
OPIATE SCREEN URINE: ABNORMAL
OXYCODONE URINE: ABNORMAL
PDW BLD-RTO: 15 % (ref 12.4–15.4)
PH UA: 7
PHENCYCLIDINE SCREEN URINE: ABNORMAL
PLATELET # BLD: 157 K/UL (ref 135–450)
PMV BLD AUTO: 9.2 FL (ref 5–10.5)
POTASSIUM SERPL-SCNC: 4.4 MMOL/L (ref 3.5–5.1)
PROPOXYPHENE SCREEN: ABNORMAL
RBC # BLD: 4.44 M/UL (ref 4.2–5.9)
SODIUM BLD-SCNC: 139 MMOL/L (ref 136–145)
VANCOMYCIN RANDOM: 10.8 UG/ML
WBC # BLD: 5.8 K/UL (ref 4–11)
WOUND/ABSCESS: NORMAL

## 2021-10-07 PROCEDURE — 83735 ASSAY OF MAGNESIUM: CPT

## 2021-10-07 PROCEDURE — 6360000002 HC RX W HCPCS: Performed by: INTERNAL MEDICINE

## 2021-10-07 PROCEDURE — 6360000002 HC RX W HCPCS: Performed by: STUDENT IN AN ORGANIZED HEALTH CARE EDUCATION/TRAINING PROGRAM

## 2021-10-07 PROCEDURE — 2580000003 HC RX 258: Performed by: STUDENT IN AN ORGANIZED HEALTH CARE EDUCATION/TRAINING PROGRAM

## 2021-10-07 PROCEDURE — 80307 DRUG TEST PRSMV CHEM ANLYZR: CPT

## 2021-10-07 PROCEDURE — 90935 HEMODIALYSIS ONE EVALUATION: CPT

## 2021-10-07 PROCEDURE — 6370000000 HC RX 637 (ALT 250 FOR IP): Performed by: INTERNAL MEDICINE

## 2021-10-07 PROCEDURE — 85025 COMPLETE CBC W/AUTO DIFF WBC: CPT

## 2021-10-07 PROCEDURE — 80048 BASIC METABOLIC PNL TOTAL CA: CPT

## 2021-10-07 PROCEDURE — 2580000003 HC RX 258: Performed by: INTERNAL MEDICINE

## 2021-10-07 PROCEDURE — 6370000000 HC RX 637 (ALT 250 FOR IP): Performed by: STUDENT IN AN ORGANIZED HEALTH CARE EDUCATION/TRAINING PROGRAM

## 2021-10-07 PROCEDURE — 87491 CHLMYD TRACH DNA AMP PROBE: CPT

## 2021-10-07 PROCEDURE — 87591 N.GONORRHOEAE DNA AMP PROB: CPT

## 2021-10-07 PROCEDURE — 80202 ASSAY OF VANCOMYCIN: CPT

## 2021-10-07 PROCEDURE — 99232 SBSQ HOSP IP/OBS MODERATE 35: CPT | Performed by: INTERNAL MEDICINE

## 2021-10-07 PROCEDURE — 36415 COLL VENOUS BLD VENIPUNCTURE: CPT

## 2021-10-07 RX ORDER — DOXYCYCLINE HYCLATE 100 MG
100 TABLET ORAL 2 TIMES DAILY
Qty: 20 TABLET | Refills: 0 | Status: SHIPPED | OUTPATIENT
Start: 2021-10-07 | End: 2021-10-17

## 2021-10-07 RX ADMIN — CEFEPIME HYDROCHLORIDE 1000 MG: 1 INJECTION, POWDER, FOR SOLUTION INTRAMUSCULAR; INTRAVENOUS at 12:31

## 2021-10-07 RX ADMIN — CEFEPIME HYDROCHLORIDE 1000 MG: 1 INJECTION, POWDER, FOR SOLUTION INTRAMUSCULAR; INTRAVENOUS at 15:11

## 2021-10-07 RX ADMIN — BUPRENORPHINE AND NALOXONE 1.5 FILM: 8; 2 FILM BUCCAL; SUBLINGUAL at 08:02

## 2021-10-07 RX ADMIN — HEPARIN SODIUM 5000 UNITS: 5000 INJECTION INTRAVENOUS; SUBCUTANEOUS at 15:12

## 2021-10-07 RX ADMIN — PANTOPRAZOLE SODIUM 40 MG: 40 TABLET, DELAYED RELEASE ORAL at 08:02

## 2021-10-07 RX ADMIN — VANCOMYCIN HYDROCHLORIDE 1000 MG: 1 INJECTION, POWDER, LYOPHILIZED, FOR SOLUTION INTRAVENOUS at 10:50

## 2021-10-07 RX ADMIN — SODIUM CHLORIDE, PRESERVATIVE FREE 10 ML: 5 INJECTION INTRAVENOUS at 08:08

## 2021-10-07 RX ADMIN — LISINOPRIL 5 MG: 5 TABLET ORAL at 12:29

## 2021-10-07 RX ADMIN — AMLODIPINE BESYLATE 5 MG: 5 TABLET ORAL at 12:29

## 2021-10-07 NOTE — PROGRESS NOTES
Discharge instruction went over with pt, all questions answered. IV flushed and discontinued, no complications. New medications and side effects went over with pt, stated understanding. Paper script provided. Per Dr Patsy Rasmussen instructed pt to go to dialysis appts. Pt waiting on transportation.  Electronically signed by Laura Álvarez RN on 10/7/2021 at 6:41 PM

## 2021-10-07 NOTE — DISCHARGE SUMMARY
Hospital Medicine Discharge Summary    Patient: David Woodall     Gender: male  : 1990   Age: 32 y.o. MRN: [id number]    Admitting Physician: Fern Shearer DO  Discharge Physician: Julian Skinner DO       Admit Date: 10/5/2021   Discharge Date:   10/7/2021    Disposition:  Home    Discharge Diagnoses: Active Hospital Problems    Diagnosis Date Noted    Infection of venous access port [T80.219A] 10/06/2021    Catheter-related bloodstream infection [T80.211A] 10/05/2021    ESRD (end stage renal disease) ([facility] [address] [N18.6] 10/05/2021    UTI (urinary tract infection) [N39.0] 10/05/2021    Polycystic kidney [Q61.3] 2021       Follow-up appointments:  one week    Outpatient to do list: f/u with PCP in 1-2 weeks                                        F/u with Nephro re h/d MWF                                       F/u with ID PRN    Condition at Discharge:  [address] Course: The patient is a 32 y.o. male with past ministry of ESRD from polycystic kidney disease and currently on hemodialysis with a tunneled dialysis catheter, hepatitis C, hypertension who presents to [facility] with concern initially for a welt and some possible purulent drainage around the area of his tunneled dialysis catheter site, also noted that he has had some dysuria and hematuria recently and night sweats for the past few days. Patient denies any true fevers at home but is overall been feeling more fatigued and tired. He denies other symptoms of dizziness, syncope, chest pain, shortness of breath, leg swelling. Patient does note using IV heroin/fentanyl but was trying to get clean and stopped about 5 days ago. Uncertain if patient's night sweats are related to this as withdrawal but patient feels as though this has been going on much longer than his drug use.     Admit, Renal, ID; All Cx remained NTD; UDS positive for cocaine; received h/d x 2; cleared for dc per ID; cleared for dc per renal; dc home in stable condition; f/u as above      Discharge Medications:   Current Discharge Medication List      START taking these medications    Details   doxycycline hyclate (VIBRA-TABS) 100 MG tablet Take 1 tablet by mouth 2 times daily for 10 days  Qty: 20 tablet, Refills: 0           Current Discharge Medication List        Current Discharge Medication List      CONTINUE these medications which have NOT CHANGED    Details   buprenorphine-naloxone (SUBOXONE) 8-2 MG FILM SL film Place 1.5 Film under the tongue daily. acetaminophen (TYLENOL) 325 MG tablet Take 650 mg by mouth every 6 hours as needed for Pain      vitamin D (CHOLECALCIFEROL) 04832 UNIT CAPS Take 2,000 Units by mouth daily      gabapentin (NEURONTIN) 100 MG capsule Take 1 capsule by mouth nightly for 30 days.  Intended supply: 90 days  Qty: 30 capsule, Refills: 1    Associated Diagnoses: RLS (restless legs syndrome)      traZODone (DESYREL) 50 MG tablet Take 1 tablet by mouth nightly as needed for Sleep  Qty: 90 tablet, Refills: 1    Associated Diagnoses: Opioid withdrawal (HCC)      pantoprazole (PROTONIX) 40 MG tablet Take 1 tablet by mouth daily  Qty: 60 tablet, Refills: 2    Associated Diagnoses: Gastroesophageal reflux disease without esophagitis      ondansetron (ZOFRAN ODT) 4 MG disintegrating tablet Take 1 tablet by mouth every 8 hours as needed for Nausea  Qty: 20 tablet, Refills: 0      lisinopril (PRINIVIL;ZESTRIL) 5 MG tablet Take 5 mg by mouth daily       vitamin E 400 UNIT capsule Take 400 Units by mouth nightly      Calcium Acetate, Phos Binder, 667 MG CAPS Take 667 mg by mouth 3 times daily (with meals)      ferrous sulfate (IRON 325) 325 (65 Fe) MG tablet Take 325 mg by mouth daily (with breakfast)      amLODIPine (NORVASC) 5 MG tablet Take 5 mg by mouth 2 times daily       cloNIDine (CATAPRES) 0.1 MG tablet Take 1-2 tablets by mouth every 4 hours as needed for Other (anxiousness)  Qty: 90 tablet, Refills: 3    Associated Diagnoses: Opioid withdrawal (HCC)      tadalafil (CIALIS) 5 MG tablet Take 1 tablet by mouth as needed for Erectile Dysfunction  Qty: 30 tablet, Refills: 0    Associated Diagnoses: Vasculogenic erectile dysfunction, unspecified vasculogenic erectile dysfunction type           Current Discharge Medication List      STOP taking these medications       dicyclomine (BENTYL) 10 MG capsule Comments:   Reason for Stopping:         hydrocortisone 2.5 % cream Comments:   Reason for Stopping:         lactulose (Numote) 10 GM/15ML solution Comments:   Reason for Stopping:         losartan (COZAAR) 50 MG tablet Comments:   Reason for Stopping:         prochlorperazine (COMPAZINE) 5 MG tablet Comments:   Reason for Stopping:         senna-docusate (PERICOLACE) 8.6-50 MG per tablet Comments:   Reason for Stopping:         sertraline (ZOLOFT) 100 MG tablet Comments:   Reason for Stopping:                                Note that greater than 30 minutes was spent in preparing discharge papers, discussing discharge with patient, medication review, etc.       Signed:    Kyler Crews DO   10/7/2021      Thank you Robbie Mariano DO for the opportunity to be involved in this patient's care.  If you have any questions or concerns please feel free to contact me at [facility]

## 2021-10-07 NOTE — FLOWSHEET NOTE
10/07/21 0847 10/07/21 1153   Vital Signs   BP (!) 163/95 126/86   Temp 96.8 °F (36 °C) 96.2 °F (35.7 °C)   Pulse 58 71   Resp 18 18   Weight 184 lb 1.4 oz (83.5 kg) 182 lb 15.7 oz (83 kg)   Weight Method Standing scale Bed scale   Post-Hemodialysis Assessment   NET Removed (ml)  --  500 ml   Treatment time: 3 hours  Net UF: 500 ml    Pre weight: 83.5 kg   Post weight: 83 kg  EDW: tbd kg    Access used: RIJ  Access function: good with  ml/min    Medications or blood products given: vancomycin    Regular outpatient schedule: [facility] T[facility]    Summary of response to treatment: good    Copy of dialysis treatment record placed in chart, to be scanned into EMR.

## 2021-10-07 NOTE — CONSULTS
Clinical Pharmacy Note  Vancomycin Consult    Yobani Macias is a 32 y.o. male ordered Vancomycin for sepsis; consult received from Dr. Siddhartha Cisneros to manage therapy. Also receiving cefepime. Patient Active Problem List   Diagnosis    ESRD (end stage renal disease) on dialysis ([facility] [address]    Polycystic kidney    Hep C w/o coma, chronic (HCC)    Fistula of artery (HCC)    Moderate episode of recurrent major depressive disorder ([facility] [address] [facility]    Opioid dependence in remission ([facility]    Catheter-related bloodstream infection    ESRD (end stage renal disease) ([facility]    UTI (urinary tract infection)    Infection of venous access port       Allergies:  Patient has no known allergies. Temp max:  Temp (24hrs), Av.1 °F (36.7 °C), Min:97.5 °F (36.4 °C), Max:98.9 °F (37.2 °C)      Recent Labs     10/05/21  1648 10/06/21  0636 10/07/21  0736   WBC 6.5 7.6 5.8       Recent Labs     10/05/21  1648 10/06/21  0636 10/07/21  0736   BUN 52* 61* 45*   CREATININE 7.7* 7.0* 6.0*         Intake/Output Summary (Last 24 hours) at 10/7/2021 0901  Last data filed at 10/6/2021 2200  Gross per 24 hour   Intake 1980 ml   Output 1300 ml   Net 680 ml       Culture Results:  Pending    Ht Readings from Last 1 Encounters:   10/05/21 6' 5\" (1.956 m)        Wt Readings from Last 1 Encounters:   10/07/21 189 lb 6 oz (85.9 kg)         Estimated Creatinine Clearance: 22 mL/min (A) (based on SCr of 6 mg/dL (HH)). Assessment/Plan:  Pt had dialysis yesterday 10/6 and again today  Random vanc level this morning is 10.8 ug/mL  Previous dose of vancomycin 1250 mg was 10/5/21@ 2100. Reviewed Dr Ryan Wright note : Await cx to make decision in regards to the line if blood cx positive will ask IR to remove the line   Re dose 1000 mg x 1 today. Await plan for HD going forward. Will get a random level in AM.    Thank you for the consult.      CATRACHITA Fernandez [facility]  10/7/2021 9:01 AM

## 2021-10-07 NOTE — PROGRESS NOTES
Nephrology ([facility]) Progress Note    CC: ESRD management    Subjective:    HPI:  Breathing comfortably. No CP. Had HD earlier today. ROS:  In be. d  [address]  medications reviewed. Objective:  Blood pressure (!) 148/83, pulse 71, temperature 96.2 °F (35.7 °C), temperature source Oral, resp. rate 18, height 6' 5\" (1.956 m), weight 182 lb 15.7 oz (83 kg), SpO2 98 %. Intake/Output Summary (Last 24 hours) at 10/7/2021 1236  Last data filed at 10/7/2021 1153  Gross per 24 hour   Intake 2000 ml   Output 2000 ml   Net 0 ml     General:  NAD, A+Ox3  Chest:   CTAB  CVS:  RRR  Abdominal:  NTND, soft, +BS  Extremities:  rashi raeann  Skin:  no rash    Labs:  Renal panel:  Lab Results   Component Value Date/Time     10/07/2021 07:36 AM    K 4.4 10/07/2021 07:36 AM    K 4.6 04/06/2021 01:12 AM    CO2 19 (L) 10/07/2021 07:36 AM    BUN 45 (H) 10/07/2021 07:36 AM    CREATININE 6.0 (HH) 10/07/2021 07:36 AM    CALCIUM 9.2 10/07/2021 07:36 AM    PHOS 4.0 05/10/2021 04:08 PM    MG 2.10 10/07/2021 07:36 AM     CBC:  Lab Results   Component Value Date/Time    WBC 5.8 10/07/2021 07:36 AM    HGB 12.1 (L) 10/07/2021 07:36 AM    HCT 37.2 (L) 10/07/2021 07:36 AM     10/07/2021 07:36 AM       Assessment/Plan:  Reviewed old records and labs. 1) ESRD              - he has been very non-compliant with dialysis treatments.               - HD MWF     2) ID              - cultures NGTD              - leaving TDC alone   - ID is planning doxycycline 100 mg bid x 10 days     3) drug abuse              - needs rehab     4) non-compliance              - poor prognosis    5) tobacco abuse   - needs to quit

## 2021-10-07 NOTE — PROGRESS NOTES
Infectious Disease Follow up Notes  Admit Date: 10/5/2021  Hospital Day: 3    Antibiotics :   IV Vancomycin  IV Cefepime      CHIEF COMPLAINT:     HD line site infection  ESRD  IVDA  Polycystic Kidney     Subjective interval History :  32 y.o. Man with IVDA. Hep C+ ESRD on HD admitted with HD line site swelling and local skin abscess has notice a lump away from the Exit site with drainage, concerned about infection presented to ED for evaluation. Per HPI last use of IV Heroin x 5 days ago. Blood cx in process and wound cx in process was placed on IV abx we are consulted for recommendations. No fevers recorded here in hospital. He had Left AV graft [facility]. IR tunneled line placed at [facility] on 79/21.          Interval History : No fevers Blood cx negative and c/o some  symptoms but UA negative will check for Gonorrhea and chlamydia UDS + FOR Cocaine+ . He has been using IVDA before admit        Past Medical History:    Past Medical History:   Diagnosis Date    Chronic kidney disease     Hematuria     Hepatitis C     Hypertension     Kidney failure     Overdose     Polycystic kidney        Past Surgical History:    History reviewed. No pertinent surgical history. Current Medications:    Outpatient Medications Marked as Taking for the 10/5/21 encounter [facility] Encounter)   Medication Sig Dispense Refill    buprenorphine-naloxone (SUBOXONE) 8-2 MG FILM SL film Place 1.5 Film under the tongue daily.  acetaminophen (TYLENOL) 325 MG tablet Take 650 mg by mouth every 6 hours as needed for Pain      vitamin D (CHOLECALCIFEROL) 45161 UNIT CAPS Take 2,000 Units by mouth daily      gabapentin (NEURONTIN) 100 MG capsule Take 1 capsule by mouth nightly for 30 days.  Intended supply: 90 days 30 capsule 1    traZODone (DESYREL) 50 MG tablet Take 1 tablet by mouth nightly as needed for Sleep 90 tablet 1  pantoprazole (PROTONIX) 40 MG tablet Take 1 tablet by mouth daily 60 tablet 2    ondansetron (ZOFRAN ODT) 4 MG disintegrating tablet Take 1 tablet by mouth every 8 hours as needed for Nausea 20 tablet 0    lisinopril (PRINIVIL;ZESTRIL) 5 MG tablet Take 5 mg by mouth daily       vitamin E 400 UNIT capsule Take 400 Units by mouth nightly      Calcium Acetate, Phos Binder, 667 MG CAPS Take 667 mg by mouth 3 times daily (with meals)      ferrous sulfate (IRON 325) 325 (65 Fe) MG tablet Take 325 mg by mouth daily (with breakfast)      amLODIPine (NORVASC) 5 MG tablet Take 5 mg by mouth 2 times daily          Allergies:  Patient has no known allergies.     Immunizations :   Immunization History   Administered Date(s) Administered    Hepatitis A/Hepatitis B (Twinrix) 06/15/2020    Influenza Virus Vaccine 09/28/2020       Social History:    Social History     Tobacco Use    Smoking status: Current Every Day Smoker     Packs/day: 1.00     Years: 15.00     Pack years: 15.00     Types: Cigarettes    Smokeless tobacco: Never Used   Vaping Use    Vaping Use: Never used   Substance Use Topics    Alcohol use: Not Currently    Drug use: Not Currently     Comment: Hx of Overdose in Care Everywhere     Social History     Tobacco Use   Smoking Status Current Every Day Smoker    Packs/day: 1.00    Years: 15.00    Pack years: 15.00    Types: Cigarettes   Smokeless Tobacco Never Used      Family History :   Bi polar, Mother and aunt, Drug abuse in [place], Father, Cousin    REVIEW OF SYSTEMS:    Constitutional:  negative for fevers, chills, night sweats  Eyes:  negative for blurred vision, eye discharge, visual disturbance   HEENT:  negative for hearing loss, ear drainage,nasal congestion  Respiratory:  negative for cough, shortness of breath or hemoptysis   Cardiovascular:  negative for chest pain, palpitations, syncope  Gastrointestinal:  negative for nausea, vomiting, diarrhea, constipation, abdominal pain  Genitourinary:  negative for frequency, dysuria+ , urinary incontinence, hematuria  Hematologic/Lymphatic:  negative for easy bruising, bleeding and lymphadenopathy  Allergic/Immunologic:  negative for recurrent infections, angioedema, anaphylaxis   Endocrine:  negative for weight changes, polyuria, polydipsia and polyphagia  Musculoskeletal:  negative for joint  pain, swelling, decreased range of motion  Integumentary: No rashes, skin lesions  Neurological:  negative for headaches, slurred speech, unilateral weakness  Psychiatric: negative for hallucinations,confusion,agitation.                 PHYSICAL EXAM:      Vitals:    BP (!) 163/95   Pulse 58   Temp 96.8 °F (36 °C) (Oral)   Resp 18   Ht 6' 5\" (1.956 m)   Wt 184 lb 1.4 oz (83.5 kg)   SpO2 98%   BMI 21.83 kg/m²     General Appearance: alert,in no acute distress, no pallor, no icterus skin changes from ESRD+   Skin: warm and dry, no rash or erythema  Head: normocephalic and atraumatic  Eyes: pupils equal, round, and reactive to light, conjunctivae normal  ENT: tympanic membrane, external ear and ear canal normal bilaterally, nose without deformity, nasal mucosa and turbinates normal without polyps  Neck: supple and non-tender without mass, no thyromegaly  no cervical lymphadenopathy  Pulmonary/Chest: clear to auscultation bilaterally- no wheezes, rales or rhonchi, normal air movement, no respiratory distress  Cardiovascular: normal rate, regular rhythm, normal S1 and S2, no murmurs, rubs, clicks, or gallops, no carotid bruits  Abdomen: soft, non-tender, non-distended, normal bowel sounds, no masses or organomegaly  Extremities: no cyanosis, clubbing or edema  Musculoskeletal: normal range of motion, no joint swelling, deformity or tenderness  Integumentary: No rashes, no abnormal skin lesions, no petechiae  Neurologic: reflexes normal and symmetric, no cranial nerve deficit  Psych:  Orientation, sensorium, mood normal            Lines: iv  HD line Rt chest wall away from the site infraclavicular area swelling+ no drainage today          Data Review:    CBC:   Lab Results   Component Value Date    WBC 5.8 10/07/2021    HGB 12.1 (L) 10/07/2021    HCT 37.2 (L) 10/07/2021    MCV 83.9 10/07/2021     10/07/2021     RENAL:   Lab Results   Component Value Date    CREATININE 6.0 (HH) 10/07/2021    BUN 45 (H) 10/07/2021     10/07/2021    K 4.4 10/07/2021     10/07/2021    CO2 19 (L) 10/07/2021     SED RATE:   Lab Results   Component Value Date    SEDRATE 15 10/05/2021     CK: No results found for: CKTOTAL  CRP:   Lab Results   Component Value Date    CRP <3.0 10/05/2021     Hepatic Function Panel:   Lab Results   Component Value Date    ALKPHOS 61 10/05/2021    ALT 7 10/05/2021    AST 9 10/05/2021    PROT 7.4 10/05/2021    BILITOT <0.2 10/05/2021    LABALBU 4.2 10/05/2021     UA:  Lab Results   Component Value Date    COLORU YELLOW 10/06/2021    CLARITYU Clear 10/06/2021    GLUCOSEU Negative 10/06/2021    BILIRUBINUR Negative 10/06/2021    KETUA Negative 10/06/2021    SPECGRAV 1.009 10/06/2021    BLOODU SMALL 10/06/2021    PHUR 7.0 10/06/2021    PROTEINU 100 10/06/2021    UROBILINOGEN 0.2 10/06/2021    NITRU Negative 10/06/2021    LEUKOCYTESUR Negative 10/06/2021    LABMICR YES 10/06/2021    URINETYPE not givn 10/06/2021      Urine Microscopic:   Lab Results   Component Value Date    BACTERIA 1+ 03/21/2021    HYALCAST 0 10/06/2021    WBCUA 1 10/06/2021    RBCUA 6 10/06/2021    EPIU 0 10/06/2021     Urine Reflex to Culture:   Lab Results   Component Value Date    URRFLXCULT Not Indicated 10/06/2021        Results for Catia Quinteros (MRN [id number]) as of 10/6/2021 13:21    Ref.  Range 5/10/2021 16:08   Hep A IgM Latest Ref Range: Non-reactive  Non-reactive   Hep A Total Ab Latest Ref Range: Negative  Positive (A)   Hep B S Ab Latest Units: mIU/mL >1000.00   Hep B S Ag Interp Latest Ref Range: Non-reactive  Non-reactive   Hep C Ab Interp Latest Ref Range: Non-reactive  REACTIVE (A)   Hep B Core Ab, IgM Latest Ref Range: Non-reactive  Non-reactive   Hep B Core Total Ab Latest Ref Range: Negative  Negative   Hepatitis C Genotype Unknown Indeterminate          MICRO: cultures reviewed and updated by me   Blood Culture:   Lab Results   Component Value Date    [facility]  10/05/2021     No Growth to date. Any change in status will be called. BLOODCULT2  10/05/2021     No Growth to date. Any change in status will be called. Respiratory Culture:  Lab Results   Component Value Date    LABGRAM No organisms seen  3+ WBC's (Polymorphonuclear)   10/05/2021     AFB:No results found for: AFBSMEAR  Viral Culture:  No results found for: COVID19  Urine Culture: No results for input(s): Osvaldo Arrington in the last 72 hours.       IMAGING:    XR CHEST 1 VIEW   Final Result   Pulmonary emphysema with no acute process               All the pertinent images and reports for the current Hospitalization were reviewed by me     Scheduled Meds:   vancomycin  1,000 mg IntraVENous Once    cefepime  1,000 mg IntraVENous Q12H    vancomycin (VANCOCIN) intermittent dosing (placeholder)   Other RX Placeholder    buprenorphine-naloxone  1.5 Film SubLINGual Daily    lisinopril  5 mg Oral Daily    pantoprazole  40 mg Oral Daily    amLODIPine  5 mg Oral BID    sodium chloride flush  5-40 mL IntraVENous 2 times per day    heparin (porcine)  5,000 Units SubCUTAneous 3 times per day       Continuous Infusions:   sodium chloride 25 mL (10/06/21 2117)       PRN Meds:  heparin (porcine), albuterol sulfate HFA **AND** ipratropium, traZODone, sodium chloride flush, sodium chloride, acetaminophen **OR** acetaminophen      Assessment:     Patient Active Problem List   Diagnosis    ESRD (end stage renal disease) [facility] [address]    Polycystic kidney    Hep C w/o coma, chronic (HCC)    Fistula of artery (HCC)    Moderate episode of recurrent major depressive disorder ([facility] [address]    Opioid dependence in remission ([facility] [address]    Catheter-related bloodstream infection    ESRD (end stage renal disease) ([facility] [address]    UTI (urinary tract infection)    Infection of venous access port       ESRD on HD  Rt HD line placed at [facility]  Now with Catheter site swelling and local skin abscess  IVDA  On going   Left AV graft excision in July at [facility]  Hep C+  Polycystic Kidney disease  CXR with Pulmonary emphysema      There is some swelling with local skin abscess above the HD line over the Chest and no drainage from the exit site Blood cx negative and area looks better today     Will choose oral abx for d/c planning as Blood cx negative and no obvious Chest line infection     Check Urine for Gonorrhea and Chlamydia      Labs, Microbiology, Radiology and all the pertinent results from current hospitalization and  care every where were reviewed  by me as a part of the evaluation   Plan:   1. D/c IV Vancomycin by level  2. D/c  IV Cefepime   3. Blood cx Negative   4. Wound cx skin toby  5. UA negative - add urine for Gonorrhea and Chlamydia   6. Doxycycline x 100 mg twice a day x 10 days  7. Prognosis poor from drug abuse      Discussed with patient/Family and Nursing staff     Thanks for allowing me to participate in your patient's care and please call me with any questions or concerns.     Apple Garcia MD  Infectious Disease  [facility]) Physician  Phone: [phone]   Fax : [phone]

## 2021-10-08 LAB
C. TRACHOMATIS DNA ,URINE: NEGATIVE
N. GONORRHOEAE DNA, URINE: NEGATIVE

## 2021-10-09 LAB
BLOOD CULTURE, ROUTINE: NORMAL
CULTURE, BLOOD 2: NORMAL

## 2021-10-13 DIAGNOSIS — H91.90 HEARING LOSS, UNSPECIFIED HEARING LOSS TYPE, UNSPECIFIED LATERALITY: Primary | ICD-10-CM

## 2021-10-19 ENCOUNTER — HOSPITAL ENCOUNTER (OUTPATIENT)
Age: 31
Discharge: HOME | End: 2021-10-19
Payer: COMMERCIAL

## 2021-10-19 ENCOUNTER — HOSPITAL ENCOUNTER (OUTPATIENT)
Dept: GENERAL RADIOLOGY | Age: 31
Discharge: HOME | End: 2021-10-19
Payer: COMMERCIAL

## 2021-10-19 DIAGNOSIS — Z99.2: ICD-10-CM

## 2021-10-19 DIAGNOSIS — I10 ESSENTIAL HYPERTENSION, MALIGNANT: ICD-10-CM

## 2021-10-19 DIAGNOSIS — N18.6 END STAGE RENAL DISEASE (HCC): ICD-10-CM

## 2021-10-19 DIAGNOSIS — F11.221: ICD-10-CM

## 2021-10-19 PROCEDURE — 71046 X-RAY EXAM CHEST 2 VIEWS: CPT

## 2021-11-05 PROBLEM — N39.0 UTI (URINARY TRACT INFECTION): Status: RESOLVED | Noted: 2021-10-05 | Resolved: 2021-11-05

## 2022-06-03 PROBLEM — T80.211A CATHETER-RELATED BLOODSTREAM INFECTION: Status: RESOLVED | Noted: 2021-10-05 | Resolved: 2022-06-03

## 2022-06-03 PROBLEM — F32.9 MDD (MAJOR DEPRESSIVE DISORDER): Status: ACTIVE | Noted: 2021-10-12

## 2022-06-03 PROBLEM — F32.9 MDD (MAJOR DEPRESSIVE DISORDER): Status: RESOLVED | Noted: 2021-10-12 | Resolved: 2022-06-03

## 2022-06-03 PROBLEM — F41.9 ANXIETY: Status: ACTIVE | Noted: 2021-10-12

## 2022-06-03 PROBLEM — I10 HYPERTENSION: Status: ACTIVE | Noted: 2021-10-12

## 2022-06-03 PROBLEM — N18.4 ANEMIA DUE TO STAGE 4 CHRONIC KIDNEY DISEASE (HCC): Status: ACTIVE | Noted: 2021-10-12

## 2022-06-03 PROBLEM — K21.9 GERD (GASTROESOPHAGEAL REFLUX DISEASE): Status: ACTIVE | Noted: 2021-10-12

## 2022-06-03 PROBLEM — D63.1 ANEMIA DUE TO STAGE 4 CHRONIC KIDNEY DISEASE (HCC): Status: ACTIVE | Noted: 2021-10-12

## 2022-06-03 PROBLEM — T80.219A INFECTION OF VENOUS ACCESS PORT: Status: RESOLVED | Noted: 2021-10-06 | Resolved: 2022-06-03

## 2022-06-03 PROBLEM — B19.20 VIRAL HEPATITIS C: Status: ACTIVE | Noted: 2017-10-16

## 2022-08-22 ENCOUNTER — TELEPHONE (OUTPATIENT)
Dept: PRIMARY CARE CLINIC | Age: 32
End: 2022-08-22

## 2022-08-26 ENCOUNTER — TELEPHONE (OUTPATIENT)
Dept: PRIMARY CARE CLINIC | Age: 32
End: 2022-08-26

## 2022-09-01 ENCOUNTER — TELEPHONE (OUTPATIENT)
Dept: PRIMARY CARE CLINIC | Age: 32
End: 2022-09-01

## 2022-09-01 NOTE — TELEPHONE ENCOUNTER
A message came is stating pt wife Julienne Weiss wanted a call back in regards to Dismissal letter that came in the mail. I called pt back he put Julienne Weiss on the phone. Be explained that he did get the letter. How ever she stated that pt was in the hospital on the date of all of his August visit witch were 8/5/22 and 8/22/22. I placed Julienne Weiss on hold and took a look into pt chart. Pt chart states he was Admitted to [facility] on 8/23 the day after his appointment and discharged on 8/26. I got back on the call with Julienne Weiss she stated pt was in [facility] and [facility]. Looking into Care every where there is not trace of a visit to the er on 8/22. Julienne Weiss also states they have paper work for all dates. Explained to Julienne Weiss that when the pt became a patient of the office he was given a list of Rules and responsibility that are expected of all patients. I also explained to her that every patient has 3 times to no show and it is up to the provider to either fire pt or continue to see them. Before letting her know that I did how ever explain to her that the provider is not able to provider the proper care for any patient if they do lots of no showing on appointment's made. I Also let her know ill be sending everything over to my  Lazarus Richters and she will reach out to them soon.

## 2024-08-29 NOTE — PLAN OF CARE
Problem: Urinary Elimination:  Goal: Signs and symptoms of infection will decrease  Description: Signs and symptoms of infection will decrease  Outcome: Ongoing   Antibiotics as ordered. Monitor labs as ordered. Problem: Infection - Central Venous Catheter-Associated Bloodstream Infection:  Goal: Will show no infection signs and symptoms  Description: Will show no infection signs and symptoms  Outcome: Ongoing   Antibiotics as ordered. Keep dressing clean and dry. Problem: Tissue Perfusion - Renal, Altered:  Goal: Serum creatinine will be within specified parameters  Description: Serum creatinine will be within specified parameters  Outcome: Ongoing   Dialysis as ordered. Monitor labs as ordered.   Problem: Discharge Planning:  Goal: Discharged to appropriate level of care  Description: Discharged to appropriate level of care  Outcome: Ongoing [FreeTextEntry1] : PT with 4/10/24: CTA CAC: 196 pLAD/mLAD: 69%, LM: 50% stenosis,  HTN, HLD, borderline LVH DILATED AO 4 cm. 10 YEAR ASCVD IS 29% IN , HYPOTHYROID, PREDM,  [facility] bradycardic   pt went ot see PMD and hr was bradycardic at times, pt hr at times to 178 and patient not symptomatic but hr to 43 bpm with normal pr interval  ekg reviewed.  pt was not dizzy or sob.  pt f/u with dr watson in [place] and medications are changing.  reviewed all meds,  Lipid normal profile.   pt monitor average hr 62 bpm, pt says rarely dizzy but does not seem to be cardiac, pt had one episode of dizziness with monitor but no abn.   23: daughter translating.  10/14/22: lvef 60%, DD1, mild mr dilated aorta 3.9 CM BORDERLINE LVH  pt complains of pain in his ankles and c/o of pain in middle thigh with lifting intermittently, pt denies claudication, pt c/o's of cp on left side intermittently for a few days and no sob, no n/v and diaphoresis.  pt had bloodwork at  office will do before our next visit.pt says bp high at home.   3/8/24: had chest pain intermittent  here after one year did not do stress test.  pt says has chest pressure lasting a short time 2 to 3 times/week. pt was suppose to get stress test and had insurance issues and went back to [place]. reviewed all medications.  no lipids, stopped simvastatin last visit. and on rosuvastatin  pt complains of frequent urination and has been on hctz for awhile. pt denies syncope/palpiations, pt works as a heavy .  pt says bp is ok at home.   3/12/24: Telephone call with NP: Patient's daughter called states patient started increased dose of Nifedipine on Saturday. On  patient was having dinner felt flushing/sweating and patient wasn't feeling well. On Monday patient went to work and felt the same after taking the medication. Patient advised to decrease medication back down to 60mg to see if symptoms improve.   24: Echo: lvef 63%, DD1, LVOT VTI: 24 cm  3/16/24: A1C: 5.9, LDL: 83, T, HDL: 54, BNP: 43 bun/cr: 18/1.14 GFR: 71  as above issue with nfiedipine 90 mg WITH FLUSHING.  pt doing ok at 60 mg nifedipine.  4/10/24: CTA CAC: 196 pLAD/mLAD: 69%, LM: 50% stenosis  pt says chest pressure now rare on omeprazole and attributes to gas. pt says gas like symptoms bothers him at times and gets tiredness on arms and burps and goes away.  pt not very active. pt says does not sob with working but not very active. pt says gets tiredness in his arms with walking.   8/15/24:  24: Patient's daughter called office stating patient's kidney function decreased. Will refer to Dr. Mandel.  24: GFR: 58, BNP: 81, HDL: 53, T, LDL: 45 improved,  A1C: 5.9 unchanged, TSH: 7.74 elevated f/u PMD  appreciate renal dr. mandel note, ckd 2a due to htn nephrosclerosis vs renovasculara dz will be monitored.  pt palpitations improved on metoprolol but patient has been feeling tired and fatigued.  pt has chronic tiredness since started metoprolol. pt denies cp or sob.  CAROTID: less than 50%, mild b/l ica.